# Patient Record
Sex: FEMALE | Race: WHITE | NOT HISPANIC OR LATINO | Employment: OTHER | URBAN - NONMETROPOLITAN AREA
[De-identification: names, ages, dates, MRNs, and addresses within clinical notes are randomized per-mention and may not be internally consistent; named-entity substitution may affect disease eponyms.]

---

## 2017-06-01 ENCOUNTER — OFFICE VISIT (OUTPATIENT)
Dept: GASTROENTEROLOGY | Facility: CLINIC | Age: 67
End: 2017-06-01

## 2017-06-01 ENCOUNTER — PREP FOR SURGERY (OUTPATIENT)
Dept: OTHER | Facility: HOSPITAL | Age: 67
End: 2017-06-01

## 2017-06-01 VITALS
TEMPERATURE: 98.6 F | SYSTOLIC BLOOD PRESSURE: 135 MMHG | DIASTOLIC BLOOD PRESSURE: 70 MMHG | WEIGHT: 145 LBS | HEIGHT: 64 IN | RESPIRATION RATE: 16 BRPM | BODY MASS INDEX: 24.75 KG/M2 | HEART RATE: 66 BPM

## 2017-06-01 DIAGNOSIS — Z80.0 FAMILY HISTORY OF COLON CANCER: ICD-10-CM

## 2017-06-01 DIAGNOSIS — R10.32 LEFT LOWER QUADRANT PAIN: Primary | ICD-10-CM

## 2017-06-01 DIAGNOSIS — K62.5 BRIGHT RED BLOOD PER RECTUM: ICD-10-CM

## 2017-06-01 DIAGNOSIS — R11.0 NAUSEA: ICD-10-CM

## 2017-06-01 PROBLEM — K83.8 CHOLANGIECTASIS: Status: ACTIVE | Noted: 2017-06-01

## 2017-06-01 PROBLEM — K80.20 CALCULUS OF GALLBLADDER: Status: ACTIVE | Noted: 2017-06-01

## 2017-06-01 PROBLEM — K63.5 COLON POLYP: Status: ACTIVE | Noted: 2017-06-01

## 2017-06-01 PROBLEM — K59.00 CONSTIPATION: Status: ACTIVE | Noted: 2017-06-01

## 2017-06-01 PROBLEM — R12 HEARTBURN: Status: ACTIVE | Noted: 2017-06-01

## 2017-06-01 PROCEDURE — 99214 OFFICE O/P EST MOD 30 MIN: CPT | Performed by: NURSE PRACTITIONER

## 2017-06-01 RX ORDER — SODIUM CHLORIDE 0.9 % (FLUSH) 0.9 %
1-10 SYRINGE (ML) INJECTION AS NEEDED
Status: CANCELLED | OUTPATIENT
Start: 2017-06-01

## 2017-06-01 RX ORDER — CIPROFLOXACIN 500 MG/1
500 TABLET, FILM COATED ORAL 2 TIMES DAILY
Qty: 14 TABLET | Refills: 0 | Status: SHIPPED | OUTPATIENT
Start: 2017-06-01 | End: 2017-06-08

## 2017-06-01 RX ORDER — METRONIDAZOLE 250 MG/1
250 TABLET ORAL 4 TIMES DAILY
Qty: 28 TABLET | Refills: 0 | Status: SHIPPED | OUTPATIENT
Start: 2017-06-01 | End: 2017-06-08

## 2017-06-01 RX ORDER — SODIUM CHLORIDE 9 MG/ML
70 INJECTION, SOLUTION INTRAVENOUS CONTINUOUS PRN
Status: CANCELLED | OUTPATIENT
Start: 2017-06-01

## 2017-06-01 NOTE — PROGRESS NOTES
"[address]  [facility] [id number]    Chief Complaint   Patient presents with   • Follow-up   • Abdominal Pain   • Rectal Bleeding   • Nausea     The patient states she had gone to the ER 6 days ago for an aching pain in her lower abdomen. She states it was sudden and lasted for 3-4 days. She states the pain is slowly improving. She was told everything was \"normal\" on CT scan. She was not given any medications. She states she began having bright red blood per rectum at the same time as the pain started. She states the bleeding lasted for 1-2 days, then improved. She had 1 episode of bright red blood with a bowel movement 3 days ago. She has not had any further bleeding. She states she did have nausea during the episode that only lasted 24 hours. This has resolved.     The patient's last colonoscopy was in 2014 with polyps. There is a family history of colon cancer in the patient's brother.    Abdominal Pain   This is a new problem. The current episode started in the past 7 days. The onset quality is sudden. The problem occurs intermittently. The problem has been unchanged. The pain is located in the suprapubic region. The pain is at a severity of 2/10. The pain is mild. The quality of the pain is aching. The abdominal pain does not radiate. Associated symptoms include hematochezia, hematuria, myalgias and nausea. Pertinent negatives include no arthralgias, constipation, diarrhea, dysuria, fever, headaches, melena or vomiting. Nothing aggravates the pain. The pain is relieved by nothing. She has tried nothing for the symptoms. Prior diagnostic workup includes CT scan.   Rectal Bleeding   This is a new problem. The current episode started in the past 7 days. The problem has been gradually improving. Associated symptoms include abdominal pain, myalgias and nausea. Pertinent negatives include no arthralgias, chest pain, chills, coughing, fatigue, fever, headaches, joint swelling, rash or vomiting. Nothing aggravates " the symptoms. She has tried nothing for the symptoms.   Nausea   This is a new problem. Episode onset:  4 days ago. Episode frequency: nausea lasted 24 hours then resolved. The problem has been resolved. Associated symptoms include abdominal pain, myalgias and nausea. Pertinent negatives include no arthralgias, chest pain, chills, coughing, fatigue, fever, headaches, joint swelling, rash or vomiting. Nothing aggravates the symptoms. She has tried nothing for the symptoms.     Review of Systems   Constitutional: Negative for appetite change, chills, fatigue, fever and unexpected weight change.   HENT: Negative for mouth sores, nosebleeds and trouble swallowing.    Eyes: Negative for discharge and redness.   Respiratory: Negative for apnea, cough and shortness of breath.    Cardiovascular: Negative for chest pain, palpitations and leg swelling.   Gastrointestinal: Positive for abdominal pain, anal bleeding, hematochezia and nausea. Negative for abdominal distention, blood in stool, constipation, diarrhea, melena and vomiting.   Endocrine: Negative for cold intolerance, heat intolerance and polydipsia.   Genitourinary: Positive for hematuria. Negative for dysuria and urgency.   Musculoskeletal: Positive for myalgias. Negative for arthralgias and joint swelling.   Skin: Negative for rash.   Allergic/Immunologic: Negative for food allergies and immunocompromised state.   Neurological: Positive for dizziness. Negative for seizures, syncope and headaches.   Hematological: Negative for adenopathy. Does not bruise/bleed easily.   Psychiatric/Behavioral: Negative for dysphoric mood. The patient is not nervous/anxious and is not hyperactive.      Patient Active Problem List   Diagnosis   • Diverticulitis of large intestine   • Fatty liver   • History of esophageal reflux   • History of hypertension   • History of headache   • Diverticulosis of colon   • Calculus of gallbladder   • Colon polyp   • Constipation   •  Cholangiectasis   • Heartburn   • Left lower quadrant pain   • Bright red blood per rectum   • Nausea   • Family history of colon cancer     Past Medical History:   Diagnosis Date   • Calculus of gallbladder     Asymptomatic gallstone   • Colon polyp 2014    Tubular adenomata   • Helicobacter positive gastritis     It appears that the patient had been treated with clarithromycin, Flagyl, and pantoprazole for 14 days and November 2014.   • Hypertension    • UTI (urinary tract infection)      Past Surgical History:   Procedure Laterality Date   • COLONOSCOPY  2014   • ENDOSCOPY      History of Diagnostic Esophagogastroduodenoscopy   • HYSTERECTOMY  2000   • UPPER GASTROINTESTINAL ENDOSCOPY  2014     Family History   Problem Relation Age of Onset   • Cancer Sister    • Stomach cancer Sister    • Colon cancer Brother    • Arthritis Mother    • Hypertension Mother    • Cirrhosis Neg Hx    • Liver disease Neg Hx    • Crohn's disease Neg Hx    • Ulcerative colitis Neg Hx    • Inflammatory bowel disease Neg Hx    • Esophageal cancer Neg Hx    • Liver cancer Neg Hx    • Rectal cancer Neg Hx      Social History   Substance Use Topics   • Smoking status: Current Every Day Smoker     Packs/day: 0.50     Years: 20.00     Types: Cigarettes     Start date: 1974   • Smokeless tobacco: Never Used   • Alcohol use No       Current Outpatient Prescriptions:   •  Acetaminophen (TYLENOL PO), Take  by mouth As Needed., Disp: , Rfl:   •  atenolol (TENORMIN) 25 MG tablet, Take  by mouth daily., Disp: , Rfl:   •  flunisolide (NASALIDE) 25 MCG/ACT (0.025%) solution nasal spray, Inhale 2 sprays every 12 (twelve) hours., Disp: 1 bottle, Rfl: 5  •  lisinopril (PRINIVIL,ZESTRIL) 20 MG tablet, Take  by mouth daily., Disp: , Rfl:   •  ciprofloxacin (CIPRO) 500 MG tablet, Take 1 tablet by mouth 2 (Two) Times a Day for 7 days. Take 1 tablet twice a day x 7 days, Disp: 14 tablet, Rfl: 0  •  metroNIDAZOLE (FLAGYL) 250 MG tablet, Take 1 tablet by mouth  "4 (Four) Times a Day for 7 days. Take 1 tablet four times daily x 7 days, Disp: 28 tablet, Rfl: 0    Allergies   Allergen Reactions   • Penicillins Itching     /70  Pulse 66  Temp 98.6 °F (37 °C)  Resp 16  Ht 64\" (162.6 cm)  Wt 145 lb (65.8 kg)  LMP  (Approximate)  BMI 24.89 kg/m2    Physical Exam   Constitutional: She is oriented to person, place, and time. She appears well-developed and well-nourished. No distress.   HENT:   Head: Normocephalic and atraumatic.   Right Ear: Hearing and external ear normal.   Left Ear: Hearing and external ear normal.   Nose: Nose normal.   Mouth/Throat: Oropharynx is clear and moist and mucous membranes are normal. Mucous membranes are not pale, not dry and not cyanotic. No oral lesions. No oropharyngeal exudate.   Eyes: Conjunctivae and EOM are normal. Right eye exhibits no discharge. Left eye exhibits no discharge.   Neck: Trachea normal. Neck supple. No JVD present. No edema present. No thyroid mass and no thyromegaly present.   Cardiovascular: Normal rate, regular rhythm, S2 normal and normal heart sounds.  Exam reveals no gallop, no S3 and no friction rub.    No murmur heard.  Pulmonary/Chest: Effort normal and breath sounds normal. No respiratory distress. She exhibits no tenderness.   Abdominal: Normal appearance and bowel sounds are normal. She exhibits no distension, no ascites and no mass. There is no splenomegaly or hepatomegaly. There is tenderness (moderate) in the left lower quadrant. There is no rigidity, no rebound and no guarding. No hernia.       Vascular Status -  Her exam exhibits no right foot edema. Her exam exhibits no left foot edema.  Lymphadenopathy:     She has no cervical adenopathy.        Left: No supraclavicular adenopathy present.   Neurological: She is alert and oriented to person, place, and time. She has normal strength. No cranial nerve deficit or sensory deficit.   Skin: No rash noted. She is not diaphoretic. No cyanosis. No pallor. " Nails show no clubbing.   Psychiatric: She has a normal mood and affect.   Nursing note and vitals reviewed.  Stigmata of chronic liver disease:  None.  Asterixis:  None.    Abdominal Imaging:  Upon review of records:    Abdominal ultrasound dated 4/12/2016 reveals liver parenchyma is mildly diffusely hyperechoic suggesting fatty infiltration.  There is no focal hepatic mass or biliary dilatation demonstrated.  Gallbladder shows no evidence of cholelithiasis or wall thickening.  There is no pericholecystic fluid.  Limited views of the pancreas are unremarkable.  There is some thinning of the right renal cortex compatible with atrophy.    CT of abdomen and pelvis without contrast dated 5/25/2017 reveals lung bases are clear.  The heart size is normal.  The limited noncontrast images of the liver are normal.  The spleen is normal.  No adrenal masses are seen.  The aorta is normal in caliber.  There aortoiliac vascular calcifications.  There is no significant free fluid.  The right kidney is normal.  There is a punctate stone within the upper pole of the left kidney.  There is no hydronephrosis.  There are several small periportal lymph nodes which are likely reactive measuring up to 11 mm.  There is mild diverticulosis without CT evidence of diverticulitis.  There is L4-L5 disc space narrowing with vacuum disc phenomenon, annular bulge, and foraminal and central canal stenosis.  The appendix is not identified.  Post hysterectomy.  The urinary bladder is decompressed.  There is no significant fluid or adenopathy.    Procedures:   EGD dated 11/5/2014 reveals subtle concentric rings noted.  Small sliding hiatal hernia less than 3 cm, nonobstructive Schatzki's type ring.  No Arrington's mucosa was seen.  Erythematous distal esophagitis.  Some debris was noted in the esophagus which could easily be me.  Erythematous gastritis. Duodenum second portion biopsy reveals no pathologic alterations, negative for celiac disease.  No  microorganisms or atypia identified.  Antrum and body biopsy reveals H. pylori positive.  Chronic active gastritis.  Negative for metaplasia, dysplasia or malignancy.  Esophagus biopsy mid and proximal revealed reflux esophagitis with rare eosinophils.  No metaplasia identified.  Negative for eosinophilic esophagitis, fungus, dysplasia or malignancy.    Colonoscopy dated 11/13/2014 reveals external hemorrhoids.  Internal hemorrhoids.  Small anal tear.  Colon polyps.  Scant early diverticular change seen in the left colon. Transverse colon polyp biopsy reveals tubular adenoma without high-grade dysplasia.  Sigmoid colon polyp and rectal biopsy reveals multiple hyperplastic polyps.    Federica was seen today for follow-up, abdominal pain, rectal bleeding and nausea.    Diagnoses and all orders for this visit:    Left lower quadrant pain  Comments:  Consistent with possible diverticulitis.  Orders:  -     ciprofloxacin (CIPRO) 500 MG tablet; Take 1 tablet by mouth 2 (Two) Times a Day for 7 days. Take 1 tablet twice a day x 7 days  -     metroNIDAZOLE (FLAGYL) 250 MG tablet; Take 1 tablet by mouth 4 (Four) Times a Day for 7 days. Take 1 tablet four times daily x 7 days    Bright red blood per rectum  Comments:  Differentials include diverticulitis, hemorhroids, fissure, vascular ectasia.  Orders:  -     ciprofloxacin (CIPRO) 500 MG tablet; Take 1 tablet by mouth 2 (Two) Times a Day for 7 days. Take 1 tablet twice a day x 7 days  -     metroNIDAZOLE (FLAGYL) 250 MG tablet; Take 1 tablet by mouth 4 (Four) Times a Day for 7 days. Take 1 tablet four times daily x 7 days    Nausea  Comments:  Resolved    Family history of colon cancer  Comments:  Patient's brother.        Plan  and Patient Instructions:  Patient Instructions   1.Treatment for diverticulitis:  A. Low-fat low fiber diet for 5 days thereafter low-fat high-fiber diet.   B. Cipro (ciprofloxacin) tablets 500 mg. Take 1 tablet by mouth twice a day for 7 days. Side  effects were discussed.   C. Flagyl (metronidazole) tablets 250 mg. Take 1 tablet one by mouth 4 times a day for 7 days. Side effects were discussed.  D. Avoid laxatives, enemas for next 5 days. However, for constipation the patient may use stool softeners.  E. Colonoscopy: Description of the procedure, risks, benefits, alternatives and options, including nonoperative options, were discussed with the patient in detail. The patient understands and wishes to proceed, although it may be advisable to wait 3-4 weeks to schedule procedure.  2. The patient is to call in 1 week with update.    Patient Care Team:  Eliud Lorenzana DO as PCP - General    Hailey Lopez, ASA

## 2017-06-01 NOTE — PATIENT INSTRUCTIONS
1.Treatment for diverticulitis:  A. Low-fat low fiber diet for 5 days thereafter low-fat high-fiber diet.   B. Cipro (ciprofloxacin) tablets 500 mg. Take 1 tablet by mouth twice a day for 7 days. Side effects were discussed.   C. Flagyl (metronidazole) tablets 250 mg. Take 1 tablet one by mouth 4 times a day for 7 days. Side effects were discussed.  D. Avoid laxatives, enemas for next 5 days. However, for constipation the patient may use stool softeners.  E. Colonoscopy: Description of the procedure, risks, benefits, alternatives and options, including nonoperative options, were discussed with the patient in detail. The patient understands and wishes to proceed, although it may be advisable to wait 3-4 weeks to schedule procedure.  2. The patient is to call in 1 week with update.

## 2017-06-29 ENCOUNTER — HOSPITAL ENCOUNTER (OUTPATIENT)
Facility: HOSPITAL | Age: 67
Setting detail: HOSPITAL OUTPATIENT SURGERY
Discharge: HOME | End: 2017-06-29
Attending: INTERNAL MEDICINE | Admitting: INTERNAL MEDICINE

## 2017-06-29 ENCOUNTER — ANESTHESIA (OUTPATIENT)
Dept: GASTROENTEROLOGY | Facility: HOSPITAL | Age: 67
End: 2017-06-29

## 2017-06-29 ENCOUNTER — ANESTHESIA EVENT (OUTPATIENT)
Dept: GASTROENTEROLOGY | Facility: HOSPITAL | Age: 67
End: 2017-06-29

## 2017-06-29 VITALS
WEIGHT: 140 LBS | TEMPERATURE: 97.7 F | DIASTOLIC BLOOD PRESSURE: 63 MMHG | HEART RATE: 55 BPM | OXYGEN SATURATION: 93 % | RESPIRATION RATE: 18 BRPM | HEIGHT: 63 IN | BODY MASS INDEX: 24.8 KG/M2 | SYSTOLIC BLOOD PRESSURE: 133 MMHG

## 2017-06-29 DIAGNOSIS — Z80.0 FAMILY HISTORY OF COLON CANCER: ICD-10-CM

## 2017-06-29 DIAGNOSIS — R10.32 LEFT LOWER QUADRANT PAIN: ICD-10-CM

## 2017-06-29 DIAGNOSIS — K62.5 BRIGHT RED BLOOD PER RECTUM: ICD-10-CM

## 2017-06-29 PROCEDURE — 45385 COLONOSCOPY W/LESION REMOVAL: CPT | Performed by: INTERNAL MEDICINE

## 2017-06-29 PROCEDURE — 45384 COLONOSCOPY W/LESION REMOVAL: CPT | Performed by: INTERNAL MEDICINE

## 2017-06-29 PROCEDURE — 88305 TISSUE EXAM BY PATHOLOGIST: CPT | Performed by: INTERNAL MEDICINE

## 2017-06-29 PROCEDURE — 25010000002 PROPOFOL 200 MG/20ML EMULSION: Performed by: NURSE ANESTHETIST, CERTIFIED REGISTERED

## 2017-06-29 RX ORDER — SODIUM CHLORIDE 0.9 % (FLUSH) 0.9 %
1-10 SYRINGE (ML) INJECTION AS NEEDED
Status: DISCONTINUED | OUTPATIENT
Start: 2017-06-29 | End: 2017-06-29 | Stop reason: HOSPADM

## 2017-06-29 RX ORDER — PROPOFOL 10 MG/ML
INJECTION, EMULSION INTRAVENOUS AS NEEDED
Status: DISCONTINUED | OUTPATIENT
Start: 2017-06-29 | End: 2017-06-29 | Stop reason: SURG

## 2017-06-29 RX ORDER — SODIUM CHLORIDE 9 MG/ML
70 INJECTION, SOLUTION INTRAVENOUS CONTINUOUS PRN
Status: DISCONTINUED | OUTPATIENT
Start: 2017-06-29 | End: 2017-06-29 | Stop reason: HOSPADM

## 2017-06-29 RX ADMIN — SODIUM CHLORIDE 70 ML/HR: 9 INJECTION, SOLUTION INTRAVENOUS at 09:01

## 2017-06-29 RX ADMIN — PROPOFOL 50 MG: 10 INJECTION, EMULSION INTRAVENOUS at 10:34

## 2017-06-29 RX ADMIN — PROPOFOL 100 MG: 10 INJECTION, EMULSION INTRAVENOUS at 11:07

## 2017-06-29 RX ADMIN — PROPOFOL 50 MG: 10 INJECTION, EMULSION INTRAVENOUS at 10:50

## 2017-06-29 RX ADMIN — PROPOFOL 100 MG: 10 INJECTION, EMULSION INTRAVENOUS at 10:41

## 2017-06-29 RX ADMIN — PROPOFOL 50 MG: 10 INJECTION, EMULSION INTRAVENOUS at 10:58

## 2017-06-29 RX ADMIN — PROPOFOL 100 MG: 10 INJECTION, EMULSION INTRAVENOUS at 11:00

## 2017-06-29 RX ADMIN — SODIUM CHLORIDE: 9 INJECTION, SOLUTION INTRAVENOUS at 10:23

## 2017-06-29 NOTE — ANESTHESIA PREPROCEDURE EVALUATION
Anesthesia Evaluation     Patient summary reviewed and Nursing notes reviewed   NPO Solid Status: > 8 hours  NPO Liquid Status: > 8 hours     Airway   Mallampati: II  TM distance: >3 FB  Neck ROM: full  no difficulty expected  Dental      Pulmonary - normal exam   Cardiovascular     Rhythm: regular  Rate: normal    (+) hypertension,       Neuro/Psych  GI/Hepatic/Renal/Endo    (+)  liver disease,     Musculoskeletal     Abdominal    Substance History      OB/GYN          Other   (+) arthritis                                     Anesthesia Plan    ASA 3     MAC     intravenous induction   Anesthetic plan and risks discussed with patient.    Plan discussed with CRNA.

## 2017-06-29 NOTE — ANESTHESIA POSTPROCEDURE EVALUATION
Patient: Federica Lay    Procedure Summary     Date Anesthesia Start Anesthesia Stop Room / Location    06/29/17 1033 [id number] [facility] ENDOSCOPY 2 / [facility] ENDOSCOPY       Procedure Diagnosis Surgeon Provider    COLONOSCOPY WITH HOT BIOPSY POLYPECTOMIES, COLD SNARE POLYPECTOMY (N/A Anus) Bright red blood per rectum; Left lower quadrant pain; Family history of colon cancer  (Bright red blood per rectum [K62.5]; Left lower quadrant pain [R10.32]; Family history of colon cancer [Z80.0]) MD Rik Rodriguez CRNA          Anesthesia Type: MAC  Last vitals  BP      Temp      Pulse     Resp      SpO2        Post Anesthesia Care and Evaluation    Patient location during evaluation: PACU  Patient participation: complete - patient participated  Level of consciousness: awake and alert  Pain score: 0  Pain management: satisfactory to patient  Airway patency: patent  Anesthetic complications: No anesthetic complications  PONV Status: none  Cardiovascular status: stable and acceptable  Respiratory status: acceptable  Hydration status: acceptable

## 2017-07-05 LAB
LAB AP CASE REPORT: NORMAL
Lab: NORMAL
PATH REPORT.FINAL DX SPEC: NORMAL

## 2017-08-21 ENCOUNTER — OFFICE VISIT (OUTPATIENT)
Dept: GASTROENTEROLOGY | Facility: CLINIC | Age: 67
End: 2017-08-21

## 2017-08-21 VITALS
BODY MASS INDEX: 26.05 KG/M2 | HEIGHT: 63 IN | DIASTOLIC BLOOD PRESSURE: 83 MMHG | TEMPERATURE: 98.4 F | RESPIRATION RATE: 16 BRPM | SYSTOLIC BLOOD PRESSURE: 151 MMHG | WEIGHT: 147 LBS | HEART RATE: 67 BPM

## 2017-08-21 DIAGNOSIS — K62.5 BRIGHT RED BLOOD PER RECTUM: ICD-10-CM

## 2017-08-21 DIAGNOSIS — R11.0 NAUSEA: ICD-10-CM

## 2017-08-21 DIAGNOSIS — K63.5 COLON POLYPS: ICD-10-CM

## 2017-08-21 DIAGNOSIS — K64.8 INTERNAL HEMORRHOIDS: Chronic | ICD-10-CM

## 2017-08-21 DIAGNOSIS — R10.32 LEFT LOWER QUADRANT PAIN: Primary | ICD-10-CM

## 2017-08-21 DIAGNOSIS — K57.30 DIVERTICULOSIS OF LARGE INTESTINE WITHOUT HEMORRHAGE: Chronic | ICD-10-CM

## 2017-08-21 PROCEDURE — 99213 OFFICE O/P EST LOW 20 MIN: CPT | Performed by: NURSE PRACTITIONER

## 2017-08-21 NOTE — PROGRESS NOTES
[address]  [facility] [address]    Chief Complaint   Patient presents with   • Follow-up   • Abdominal Pain     History of Present Illness    The patient is here for follow up. The patient states the left lower quadrant pain has resolved. She has not had any further episodes since she finished antibiotics. She is no longer having nausea. This has resolved. She is no longer having bright red blood per rectum. The patient denies heartburn. There is no history of difficulty swallowing.    Review of Systems   Constitutional: Negative for appetite change, chills, fatigue, fever and unexpected weight change.   HENT: Negative for mouth sores, nosebleeds and trouble swallowing.    Eyes: Negative for discharge and redness.   Respiratory: Negative for apnea, cough and shortness of breath.    Cardiovascular: Negative for chest pain, palpitations and leg swelling.   Gastrointestinal: Positive for abdominal pain. Negative for abdominal distention, anal bleeding, blood in stool, constipation, diarrhea, nausea and vomiting.   Endocrine: Negative for cold intolerance, heat intolerance and polydipsia.   Genitourinary: Negative for dysuria, hematuria and urgency.   Musculoskeletal: Positive for arthralgias. Negative for joint swelling and myalgias.   Skin: Negative for rash.   Allergic/Immunologic: Negative for food allergies and immunocompromised state.   Neurological: Negative for dizziness, seizures, syncope and headaches.   Hematological: Negative for adenopathy. Does not bruise/bleed easily.   Psychiatric/Behavioral: Negative for dysphoric mood. The patient is not nervous/anxious and is not hyperactive.      Patient Active Problem List   Diagnosis   • Diverticulitis of large intestine   • Fatty liver   • History of esophageal reflux   • History of hypertension   • History of headache   • Diverticulosis of colon   • Calculus of gallbladder   • Colon polyp   • Constipation   • Cholangiectasis   • Heartburn   • Left lower  quadrant pain   • Bright red blood per rectum   • Nausea   • Family history of colon cancer   • Colon polyps   • Internal hemorrhoids     Past Medical History:   Diagnosis Date   • Calculus of gallbladder     Asymptomatic gallstone   • Colon polyp 2014    Tubular adenomata   • Diverticulitis    • Full dentures    • H/O exercise stress test    • Helicobacter positive gastritis     It appears that the patient had been treated with clarithromycin, Flagyl, and pantoprazole for 14 days and November 2014.   • Hypertension    • UTI (urinary tract infection)    • Wears glasses     TO DRIVE     Past Surgical History:   Procedure Laterality Date   • COLONOSCOPY  2014   • COLONOSCOPY N/A 6/29/2017    Procedure: COLONOSCOPY WITH HOT BIOPSY POLYPECTOMIES, COLD SNARE POLYPECTOMY;  Surgeon: Abraham Stokes MD;  Location: [facility] ENDOSCOPY;  Service:    • ENDOSCOPY     • HYSTERECTOMY  2000   • UPPER GASTROINTESTINAL ENDOSCOPY  2014     Family History   Problem Relation Age of Onset   • Cancer Sister    • Stomach cancer Sister    • Colon cancer Brother    • Arthritis Mother    • Hypertension Mother    • Cirrhosis Neg Hx    • Liver disease Neg Hx    • Crohn's disease Neg Hx    • Ulcerative colitis Neg Hx    • Inflammatory bowel disease Neg Hx    • Esophageal cancer Neg Hx    • Liver cancer Neg Hx    • Rectal cancer Neg Hx      Social History   Substance Use Topics   • Smoking status: Current Every Day Smoker     Packs/day: 0.50     Years: 20.00     Types: Cigarettes     Start date: 1974   • Smokeless tobacco: Never Used   • Alcohol use No       Current Outpatient Prescriptions:   •  Acetaminophen (TYLENOL PO), Take 1,000 mg by mouth Every 6 (Six) Hours As Needed., Disp: , Rfl:   •  atenolol (TENORMIN) 25 MG tablet, Take  by mouth daily., Disp: , Rfl:   •  lisinopril (PRINIVIL,ZESTRIL) 20 MG tablet, Take  by mouth daily., Disp: , Rfl:     Allergies   Allergen Reactions   • Penicillins Itching     /83  Pulse 67  Temp 98.4 °F  "(36.9 °C)  Resp 16  Ht 63\" (160 cm)  Wt 147 lb (66.7 kg)  LMP  (LMP Unknown)  BMI 26.04 kg/m2    Physical Exam   Constitutional: She is oriented to person, place, and time. She appears well-developed and well-nourished. No distress.   HENT:   Head: Normocephalic and atraumatic.   Right Ear: Hearing and external ear normal.   Left Ear: Hearing and external ear normal.   Nose: Nose normal.   Mouth/Throat: Oropharynx is clear and moist and mucous membranes are normal. Mucous membranes are not pale, not dry and not cyanotic. No oral lesions. No oropharyngeal exudate.   Eyes: Conjunctivae and EOM are normal. Right eye exhibits no discharge. Left eye exhibits no discharge.   Neck: Trachea normal. Neck supple. No JVD present. No edema present. No thyroid mass and no thyromegaly present.   Cardiovascular: Normal rate, regular rhythm, S2 normal and normal heart sounds.  Exam reveals no gallop, no S3 and no friction rub.    No murmur heard.  Pulmonary/Chest: Effort normal and breath sounds normal. No respiratory distress. She exhibits no tenderness.   Abdominal: Normal appearance and bowel sounds are normal. She exhibits no distension, no ascites and no mass. There is no splenomegaly or hepatomegaly. There is no tenderness. There is no rigidity, no rebound and no guarding. No hernia.       Vascular Status -  Her exam exhibits no right foot edema. Her exam exhibits no left foot edema.  Lymphadenopathy:     She has no cervical adenopathy.        Left: No supraclavicular adenopathy present.   Neurological: She is alert and oriented to person, place, and time. She has normal strength. No cranial nerve deficit or sensory deficit.   Skin: No rash noted. She is not diaphoretic. No cyanosis. No pallor. Nails show no clubbing.   Psychiatric: She has a normal mood and affect.   Nursing note and vitals reviewed.  Stigmata of chronic liver disease:  None.  Asterixis:  None.    Abdominal Imaging:  Upon review of records:   "   Abdominal ultrasound dated 4/12/2016 reveals liver parenchyma is mildly diffusely hyperechoic suggesting fatty infiltration.  There is no focal hepatic mass or biliary dilatation demonstrated.  Gallbladder shows no evidence of cholelithiasis or wall thickening.  There is no pericholecystic fluid.  Limited views of the pancreas are unremarkable.  There is some thinning of the right renal cortex compatible with atrophy.     CT of abdomen and pelvis without contrast dated 5/25/2017 reveals lung bases are clear.  The heart size is normal.  The limited noncontrast images of the liver are normal.  The spleen is normal.  No adrenal masses are seen.  The aorta is normal in caliber.  There aortoiliac vascular calcifications.  There is no significant free fluid.  The right kidney is normal.  There is a punctate stone within the upper pole of the left kidney.  There is no hydronephrosis.  There are several small periportal lymph nodes which are likely reactive measuring up to 11 mm.  There is mild diverticulosis without CT evidence of diverticulitis.  There is L4-L5 disc space narrowing with vacuum disc phenomenon, annular bulge, and foraminal and central canal stenosis.  The appendix is not identified.  Post hysterectomy.  The urinary bladder is decompressed.  There is no significant fluid or adenopathy.     Procedures:   Upon review of records:    EGD dated 11/5/2014 reveals subtle concentric rings noted.  Small sliding hiatal hernia less than 3 cm, nonobstructive Schatzki's type ring.  No Arrington's mucosa was seen.  Erythematous distal esophagitis.  Some debris was noted in the esophagus which could easily be me.  Erythematous gastritis. Duodenum second portion biopsy reveals no pathologic alterations, negative for celiac disease.  No microorganisms or atypia identified.  Antrum and body biopsy reveals H. pylori positive.  Chronic active gastritis.  Negative for metaplasia, dysplasia or malignancy.  Esophagus biopsy mid  and proximal revealed reflux esophagitis with rare eosinophils.  No metaplasia identified.  Negative for eosinophilic esophagitis, fungus, dysplasia or malignancy.     Colonoscopy dated 11/13/2014 reveals external hemorrhoids.  Internal hemorrhoids.  Small anal tear.  Colon polyps.  Scant early diverticular change seen in the left colon. Transverse colon polyp biopsy reveals tubular adenoma without high-grade dysplasia.  Sigmoid colon polyp and rectal biopsy reveals multiple hyperplastic polyps.    Colonoscopy dated 6/29/2017 reveals scant early diverticular change in the left colon.  Colon polyps.  Internal hemorrhoids.  Descending colon polyp biopsy reveals tubular adenoma fragments without high-grade dysplasia.  Hepatic flexure polyp reveals tubular adenoma fragments without high-grade dysplasia.  Rectal polyp biopsy reveals hyperplastic polyp.    Assessment and Plan:      Federica was seen today for follow-up and abdominal pain.    Diagnoses and all orders for this visit:    Left lower quadrant pain  Comments:  Resolved.    Bright red blood per rectum  Comments:  Improved. Likely secondary to hemorrhoids.    Nausea  Comments:  Resolved.    Diverticulosis of large intestine without hemorrhage  Comments:  Scant left sided diverticular change. Stable.    Colon polyps  Comments:  Tubular adenoma.    Internal hemorrhoids  Comments:  Stable. Uncomplicated.        Plan  and Patient Instructions:  Patient Instructions   1. High fiber diet with liberal water intake. Discussed in detail.  2. Follow up colonoscopy in 5 years.  3. Follow up: PRN    Patient Care Team:  Eliud Lorenzana DO as PCP - General    [facility]

## 2017-08-21 NOTE — PATIENT INSTRUCTIONS
1. High fiber diet with liberal water intake. Discussed in detail.  2. Follow up colonoscopy in 5 years.  3. Follow up: PRN

## 2017-08-24 ENCOUNTER — TRANSCRIBE ORDERS (OUTPATIENT)
Dept: ULTRASOUND IMAGING | Facility: HOSPITAL | Age: 67
End: 2017-08-24

## 2017-08-24 DIAGNOSIS — N64.4 BREAST PAIN: Primary | ICD-10-CM

## 2017-09-08 ENCOUNTER — HOSPITAL ENCOUNTER (OUTPATIENT)
Dept: ULTRASOUND IMAGING | Facility: HOSPITAL | Age: 67
Discharge: HOME | End: 2017-09-08
Admitting: NURSE PRACTITIONER

## 2017-09-08 DIAGNOSIS — N64.4 BREAST PAIN: ICD-10-CM

## 2017-09-08 PROCEDURE — 76641 ULTRASOUND BREAST COMPLETE: CPT

## 2017-11-08 ENCOUNTER — HOSPITAL ENCOUNTER (OUTPATIENT)
Dept: CT IMAGING | Facility: HOSPITAL | Age: 67
Discharge: HOME | End: 2017-11-08
Admitting: NURSE PRACTITIONER

## 2017-11-08 ENCOUNTER — LAB (OUTPATIENT)
Dept: LAB | Facility: HOSPITAL | Age: 67
End: 2017-11-08

## 2017-11-08 ENCOUNTER — TRANSCRIBE ORDERS (OUTPATIENT)
Dept: LAB | Facility: HOSPITAL | Age: 67
End: 2017-11-08

## 2017-11-08 DIAGNOSIS — R10.31 ABDOMINAL PAIN, RIGHT LOWER QUADRANT: ICD-10-CM

## 2017-11-08 DIAGNOSIS — R10.31 ABDOMINAL PAIN, RIGHT LOWER QUADRANT: Primary | ICD-10-CM

## 2017-11-08 LAB
ALBUMIN SERPL-MCNC: 4.5 G/DL (ref 3.5–5)
ALBUMIN/GLOB SERPL: 1.9 G/DL (ref 1–2)
ALP SERPL-CCNC: 68 U/L (ref 38–126)
ALT SERPL W P-5'-P-CCNC: 29 U/L (ref 13–69)
AMYLASE SERPL-CCNC: 94 U/L (ref 30–110)
ANION GAP SERPL CALCULATED.3IONS-SCNC: 14.7 MMOL/L
AST SERPL-CCNC: 22 U/L (ref 15–46)
BASOPHILS # BLD AUTO: 0.05 10*3/MM3 (ref 0–0.2)
BASOPHILS NFR BLD AUTO: 0.6 % (ref 0–2.5)
BILIRUB SERPL-MCNC: 0.3 MG/DL (ref 0.2–1.3)
BUN BLD-MCNC: 14 MG/DL (ref 7–20)
BUN/CREAT SERPL: 11.7 (ref 7.1–23.5)
CALCIUM SPEC-SCNC: 9.6 MG/DL (ref 8.4–10.2)
CHLORIDE SERPL-SCNC: 105 MMOL/L (ref 98–107)
CO2 SERPL-SCNC: 29 MMOL/L (ref 26–30)
CREAT BLD-MCNC: 1.2 MG/DL (ref 0.6–1.3)
DEPRECATED RDW RBC AUTO: 44.6 FL (ref 37–54)
EOSINOPHIL # BLD AUTO: 0.24 10*3/MM3 (ref 0–0.7)
EOSINOPHIL NFR BLD AUTO: 2.9 % (ref 0–7)
ERYTHROCYTE [DISTWIDTH] IN BLOOD BY AUTOMATED COUNT: 12.2 % (ref 11.5–14.5)
GFR SERPL CREATININE-BSD FRML MDRD: 45 ML/MIN/1.73
GLOBULIN UR ELPH-MCNC: 2.4 GM/DL
GLUCOSE BLD-MCNC: 98 MG/DL (ref 74–98)
HCT VFR BLD AUTO: 42.6 % (ref 37–47)
HGB BLD-MCNC: 13.8 G/DL (ref 12–16)
IMM GRANULOCYTES # BLD: 0.03 10*3/MM3 (ref 0–0.06)
IMM GRANULOCYTES NFR BLD: 0.4 % (ref 0–0.6)
LIPASE SERPL-CCNC: 155 U/L (ref 23–300)
LYMPHOCYTES # BLD AUTO: 2.37 10*3/MM3 (ref 0.6–3.4)
LYMPHOCYTES NFR BLD AUTO: 28.2 % (ref 10–50)
MCH RBC QN AUTO: 32.3 PG (ref 27–31)
MCHC RBC AUTO-ENTMCNC: 32.4 G/DL (ref 30–37)
MCV RBC AUTO: 99.8 FL (ref 81–99)
MONOCYTES # BLD AUTO: 0.55 10*3/MM3 (ref 0–0.9)
MONOCYTES NFR BLD AUTO: 6.5 % (ref 0–12)
NEUTROPHILS # BLD AUTO: 5.17 10*3/MM3 (ref 2–6.9)
NEUTROPHILS NFR BLD AUTO: 61.4 % (ref 37–80)
NRBC BLD MANUAL-RTO: 0 /100 WBC (ref 0–0)
PLATELET # BLD AUTO: 209 10*3/MM3 (ref 130–400)
PMV BLD AUTO: 10.8 FL (ref 6–12)
POTASSIUM BLD-SCNC: 4.7 MMOL/L (ref 3.5–5.1)
PROT SERPL-MCNC: 6.9 G/DL (ref 6.3–8.2)
RBC # BLD AUTO: 4.27 10*6/MM3 (ref 4.2–5.4)
SODIUM BLD-SCNC: 144 MMOL/L (ref 137–145)
WBC NRBC COR # BLD: 8.41 10*3/MM3 (ref 4.8–10.8)

## 2017-11-08 PROCEDURE — 82150 ASSAY OF AMYLASE: CPT | Performed by: NURSE PRACTITIONER

## 2017-11-08 PROCEDURE — 0 IOPAMIDOL 61 % SOLUTION: Performed by: RADIOLOGY

## 2017-11-08 PROCEDURE — 85025 COMPLETE CBC W/AUTO DIFF WBC: CPT | Performed by: NURSE PRACTITIONER

## 2017-11-08 PROCEDURE — 80053 COMPREHEN METABOLIC PANEL: CPT | Performed by: NURSE PRACTITIONER

## 2017-11-08 PROCEDURE — 83690 ASSAY OF LIPASE: CPT | Performed by: NURSE PRACTITIONER

## 2017-11-08 PROCEDURE — 74177 CT ABD & PELVIS W/CONTRAST: CPT

## 2017-11-08 PROCEDURE — 36415 COLL VENOUS BLD VENIPUNCTURE: CPT

## 2017-11-08 RX ADMIN — IOPAMIDOL 90 ML: 612 INJECTION, SOLUTION INTRAVENOUS at 18:00

## 2018-01-25 ENCOUNTER — OFFICE VISIT (OUTPATIENT)
Dept: NEUROLOGY | Facility: CLINIC | Age: 68
End: 2018-01-25

## 2018-01-25 VITALS
SYSTOLIC BLOOD PRESSURE: 140 MMHG | BODY MASS INDEX: 26.05 KG/M2 | HEIGHT: 63 IN | DIASTOLIC BLOOD PRESSURE: 78 MMHG | HEART RATE: 72 BPM | OXYGEN SATURATION: 96 % | WEIGHT: 147 LBS

## 2018-01-25 DIAGNOSIS — R42 VERTIGO: Primary | ICD-10-CM

## 2018-01-25 PROCEDURE — 99203 OFFICE O/P NEW LOW 30 MIN: CPT | Performed by: NURSE PRACTITIONER

## 2018-01-25 RX ORDER — MECLIZINE HYDROCHLORIDE 25 MG/1
25 TABLET ORAL 3 TIMES DAILY PRN
COMMUNITY
End: 2020-06-09

## 2018-08-06 ENCOUNTER — TRANSCRIBE ORDERS (OUTPATIENT)
Dept: ADMINISTRATIVE | Facility: HOSPITAL | Age: 68
End: 2018-08-06

## 2018-08-06 DIAGNOSIS — Z87.891 PERSONAL HISTORY OF NICOTINE DEPENDENCE: Primary | ICD-10-CM

## 2018-08-14 ENCOUNTER — HOSPITAL ENCOUNTER (OUTPATIENT)
Dept: CT IMAGING | Facility: HOSPITAL | Age: 68
Discharge: HOME | End: 2018-08-14
Admitting: NURSE PRACTITIONER

## 2018-08-14 DIAGNOSIS — Z87.891 PERSONAL HISTORY OF NICOTINE DEPENDENCE: ICD-10-CM

## 2018-08-14 PROCEDURE — G0297 LDCT FOR LUNG CA SCREEN: HCPCS

## 2018-12-26 ENCOUNTER — TRANSCRIBE ORDERS (OUTPATIENT)
Dept: ADMINISTRATIVE | Facility: HOSPITAL | Age: 68
End: 2018-12-26

## 2018-12-26 DIAGNOSIS — R55 SYNCOPE, UNSPECIFIED SYNCOPE TYPE: Primary | ICD-10-CM

## 2019-08-22 ENCOUNTER — TRANSCRIBE ORDERS (OUTPATIENT)
Dept: ADMINISTRATIVE | Facility: HOSPITAL | Age: 69
End: 2019-08-22

## 2019-08-22 DIAGNOSIS — Z87.891 PERSONAL HISTORY OF TOBACCO USE, PRESENTING HAZARDS TO HEALTH: Primary | ICD-10-CM

## 2019-09-09 ENCOUNTER — APPOINTMENT (OUTPATIENT)
Dept: CT IMAGING | Facility: HOSPITAL | Age: 69
End: 2019-09-09

## 2019-09-16 ENCOUNTER — HOSPITAL ENCOUNTER (OUTPATIENT)
Dept: CT IMAGING | Facility: HOSPITAL | Age: 69
Discharge: HOME | End: 2019-09-16
Admitting: NURSE PRACTITIONER

## 2019-09-16 DIAGNOSIS — Z87.891 PERSONAL HISTORY OF TOBACCO USE, PRESENTING HAZARDS TO HEALTH: ICD-10-CM

## 2019-09-16 PROCEDURE — G0297 LDCT FOR LUNG CA SCREEN: HCPCS

## 2020-03-04 ENCOUNTER — TRANSCRIBE ORDERS (OUTPATIENT)
Dept: CT IMAGING | Facility: HOSPITAL | Age: 70
End: 2020-03-04

## 2020-03-04 DIAGNOSIS — R10.31 ABDOMINAL PAIN, RIGHT LOWER QUADRANT: Primary | ICD-10-CM

## 2020-03-11 ENCOUNTER — APPOINTMENT (OUTPATIENT)
Dept: CT IMAGING | Facility: HOSPITAL | Age: 70
End: 2020-03-11

## 2020-03-19 ENCOUNTER — TRANSCRIBE ORDERS (OUTPATIENT)
Dept: ADMINISTRATIVE | Facility: HOSPITAL | Age: 70
End: 2020-03-19

## 2020-03-19 DIAGNOSIS — R93.2 ABNORMAL ULTRASOUND OF GALLBLADDER: Primary | ICD-10-CM

## 2020-03-25 ENCOUNTER — APPOINTMENT (OUTPATIENT)
Dept: NUCLEAR MEDICINE | Facility: HOSPITAL | Age: 70
End: 2020-03-25

## 2020-04-29 ENCOUNTER — APPOINTMENT (OUTPATIENT)
Dept: NUCLEAR MEDICINE | Facility: HOSPITAL | Age: 70
End: 2020-04-29

## 2020-05-27 ENCOUNTER — HOSPITAL ENCOUNTER (OUTPATIENT)
Dept: ULTRASOUND IMAGING | Facility: HOSPITAL | Age: 70
Discharge: HOME | End: 2020-05-27
Admitting: NURSE PRACTITIONER

## 2020-05-27 DIAGNOSIS — R93.2 ABNORMAL ULTRASOUND OF GALLBLADDER: ICD-10-CM

## 2020-05-27 PROCEDURE — 76700 US EXAM ABDOM COMPLETE: CPT

## 2020-05-29 ENCOUNTER — HOSPITAL ENCOUNTER (OUTPATIENT)
Dept: NUCLEAR MEDICINE | Facility: HOSPITAL | Age: 70
Discharge: HOME | End: 2020-05-29

## 2020-05-29 DIAGNOSIS — R93.2 ABNORMAL ULTRASOUND OF GALLBLADDER: ICD-10-CM

## 2020-05-29 PROCEDURE — A9537 TC99M MEBROFENIN: HCPCS | Performed by: NURSE PRACTITIONER

## 2020-05-29 PROCEDURE — 78227 HEPATOBIL SYST IMAGE W/DRUG: CPT

## 2020-05-29 PROCEDURE — 0 TECHNETIUM TC 99M MEBROFENIN KIT: Performed by: NURSE PRACTITIONER

## 2020-05-29 PROCEDURE — 25010000002 SINCALIDE PER 5 MCG: Performed by: NURSE PRACTITIONER

## 2020-05-29 RX ORDER — KIT FOR THE PREPARATION OF TECHNETIUM TC 99M MEBROFENIN 45 MG/10ML
1 INJECTION, POWDER, LYOPHILIZED, FOR SOLUTION INTRAVENOUS
Status: COMPLETED | OUTPATIENT
Start: 2020-05-29 | End: 2020-05-29

## 2020-05-29 RX ADMIN — MEBROFENIN 1 DOSE: 45 INJECTION, POWDER, LYOPHILIZED, FOR SOLUTION INTRAVENOUS at 10:37

## 2020-05-29 RX ADMIN — SINCALIDE 1.3 MCG: 5 INJECTION, POWDER, LYOPHILIZED, FOR SOLUTION INTRAVENOUS at 11:20

## 2020-06-08 RX ORDER — AMLODIPINE BESYLATE 10 MG/1
10 TABLET ORAL DAILY
COMMUNITY
End: 2020-07-17 | Stop reason: HOSPADM

## 2020-06-09 ENCOUNTER — RESULTS ENCOUNTER (OUTPATIENT)
Dept: GASTROENTEROLOGY | Facility: CLINIC | Age: 70
End: 2020-06-09

## 2020-06-09 ENCOUNTER — OFFICE VISIT (OUTPATIENT)
Dept: GASTROENTEROLOGY | Facility: CLINIC | Age: 70
End: 2020-06-09

## 2020-06-09 VITALS
HEART RATE: 65 BPM | WEIGHT: 161 LBS | RESPIRATION RATE: 12 BRPM | SYSTOLIC BLOOD PRESSURE: 173 MMHG | HEIGHT: 63 IN | TEMPERATURE: 99.1 F | BODY MASS INDEX: 28.53 KG/M2 | DIASTOLIC BLOOD PRESSURE: 83 MMHG

## 2020-06-09 DIAGNOSIS — R13.10 DYSPHAGIA, UNSPECIFIED TYPE: Chronic | ICD-10-CM

## 2020-06-09 DIAGNOSIS — Z11.59 ENCOUNTER FOR SCREENING FOR OTHER VIRAL DISEASES: ICD-10-CM

## 2020-06-09 DIAGNOSIS — R07.9 CHEST PAIN, UNSPECIFIED TYPE: Chronic | ICD-10-CM

## 2020-06-09 DIAGNOSIS — R10.13 EPIGASTRIC PAIN: Chronic | ICD-10-CM

## 2020-06-09 DIAGNOSIS — K76.0 FATTY INFILTRATION OF LIVER: Chronic | ICD-10-CM

## 2020-06-09 DIAGNOSIS — K83.8 DILATED CBD, ACQUIRED: Chronic | ICD-10-CM

## 2020-06-09 DIAGNOSIS — K83.8 DILATED CBD, ACQUIRED: Primary | Chronic | ICD-10-CM

## 2020-06-09 PROBLEM — R10.32 LEFT LOWER QUADRANT PAIN: Status: RESOLVED | Noted: 2017-06-01 | Resolved: 2020-06-09

## 2020-06-09 PROBLEM — K62.5 BRIGHT RED BLOOD PER RECTUM: Status: RESOLVED | Noted: 2017-06-01 | Resolved: 2020-06-09

## 2020-06-09 PROCEDURE — 99214 OFFICE O/P EST MOD 30 MIN: CPT | Performed by: NURSE PRACTITIONER

## 2020-06-09 RX ORDER — LORATADINE 10 MG/1
10 TABLET ORAL DAILY
COMMUNITY
End: 2021-05-04

## 2020-06-09 RX ORDER — PANTOPRAZOLE SODIUM 40 MG/1
TABLET, DELAYED RELEASE ORAL
Qty: 30 TABLET | Refills: 5 | Status: SHIPPED | OUTPATIENT
Start: 2020-06-09 | End: 2021-05-04

## 2020-06-09 NOTE — PATIENT INSTRUCTIONS
1. Antireflux measures: Avoid fried, fatty foods, alcohol, chocolate, coffee, tea,  soft drinks, peppermint and spearmint, spicy foods, tomatoes and tomato based foods, onion based foods, and smoking. Other antireflux measures include weight reduction if overweight, avoiding tight clothing around the abdomen, elevating the head of the bed 6 inches with blocks under the head board, and don't drink or eat before going to bed and avoid lying down immediately after meals.  2. Pantoprazole 40 mg 1 by mouth in the am 30 minutes before breakfast.  3. High fiber, low fat diet with liberal water intake.   4. CBC, CMP, hepatitis panel, MATT, mitochondrial Ab, antimicrosomal Ab, Smooth muscle Ab  5. MRCP-the patient wishes to have done at [facility].  6. The patient may call for results.  7. Dietary instructions.  The patient should eat relatively soft diet, and should eat in upright position and chew well.  The patient should drink water after 2-3 bites and take medications with liberal amounts of water.  Generally medications that have a potential to cause pill esophagitis may be avoided or used in an alternative form (for example if the patient needs potassium it may be used in a liquid rather than pill form).  Furthermore after eating and taking medications, the patient should remain in upright position for 5-10 minutes.  8. Possible EGD in the future.   9. Follow up: 4-6 weeks

## 2020-06-09 NOTE — PROGRESS NOTES
Chief Complaint   Patient presents with   • Follow-up   • Abdominal Pain   • Chest Pain     The patient has been having pain in her upper chest near her clavicle for the past 6 months or more. The patient has the pain every day. Eating does not affect the pain. The pain is mild. The pain is described as tenderness to touch. Movement makes the pain worse. The patient denies jaw pain or arm pain. No shortness of breath. The patient has been seen by cardiology, Dr. Patel, and told there was no evidence of cardiac cause.    There is a long standing history of epigastric pain. The patient may have the pain once per week. The pain is mild and sharp. Eating does not affect the pain. There is no history of heartburn or reflux.    There is a history of difficulty swallowing for the past few months. The patient has difficulty swallowing solids a few times per week. No history of difficulty swallowing liquids.    There is no history of constipation or diarrhea. The patient denies bright red blood per rectum or melena.    Fatty infiltration of the liver was noted on recent ultrasound. CBD was dilated to 10.3 mm in presence of gallbladder. No stones were noted. Liver enzymes were normal per labs in March 2020.    The patient's last colonoscopy was in 2017. There is a family history of colon cancer in the patient's brother.    Abdominal Pain   This is a chronic problem. The current episode started more than 1 month ago. The onset quality is gradual. The problem occurs intermittently. The problem has been unchanged. The pain is located in the epigastric region. The pain is mild. The quality of the pain is sharp. The abdominal pain does not radiate. Associated symptoms include arthralgias. Pertinent negatives include no constipation, diarrhea, dysuria, fever, headaches, hematuria, myalgias, nausea or vomiting. Nothing aggravates the pain. The pain is relieved by nothing. She has tried nothing for the symptoms.   Chest Pain    This  is a chronic problem. Episode onset: over 6 months ago. The onset quality is gradual. The problem occurs daily. The problem has been unchanged. Pain location: clavicle. The pain is mild. Quality: tender to touch. The pain does not radiate. Associated symptoms include dizziness (vertigo). Pertinent negatives include no abdominal pain, cough, fever, headaches, nausea, palpitations, shortness of breath or vomiting. The pain is aggravated by movement. She has tried nothing for the symptoms. There are no known risk factors.   Pertinent negatives for past medical history include no seizures.   Difficulty Swallowing   This is a chronic problem. Episode onset: 2-3 months ago. The problem occurs intermittently. The problem has been unchanged. Associated symptoms include arthralgias and chest pain. Pertinent negatives include no abdominal pain, chills, coughing, fatigue, fever, headaches, joint swelling, myalgias, nausea, rash or vomiting. The symptoms are aggravated by eating. She has tried nothing for the symptoms.     Review of Systems   Constitutional: Negative for appetite change, chills, fatigue, fever and unexpected weight change.   HENT: Negative for mouth sores, nosebleeds and trouble swallowing.    Eyes: Negative for discharge and redness.   Respiratory: Negative for apnea, cough and shortness of breath.    Cardiovascular: Positive for chest pain. Negative for palpitations and leg swelling.   Gastrointestinal: Negative for abdominal distention, abdominal pain, anal bleeding, blood in stool, constipation, diarrhea, nausea and vomiting.   Endocrine: Negative for cold intolerance, heat intolerance and polydipsia.   Genitourinary: Negative for dysuria, hematuria and urgency.   Musculoskeletal: Positive for arthralgias. Negative for joint swelling and myalgias.   Skin: Negative for rash.   Allergic/Immunologic: Positive for environmental allergies. Negative for food allergies and immunocompromised state.   Neurological:  Positive for dizziness (vertigo). Negative for seizures, syncope and headaches.   Hematological: Negative for adenopathy. Does not bruise/bleed easily.   Psychiatric/Behavioral: Negative for dysphoric mood. The patient is not nervous/anxious and is not hyperactive.      Patient Active Problem List   Diagnosis   • Diverticulitis of large intestine   • Fatty infiltration of liver   • History of esophageal reflux   • History of hypertension   • History of headache   • Diverticulosis of colon   • Calculus of gallbladder   • Colon polyp   • Constipation   • Dilated cbd, acquired   • Heartburn   • Nausea   • Family history of colon cancer   • Colon polyps   • Internal hemorrhoids   • Epigastric pain   • Chest pain   • Dysphagia     Past Medical History:   Diagnosis Date   • Calculus of gallbladder     Asymptomatic gallstone   • Chronic airway obstruction (CMS/HCC)    • Colon polyp 2014    Tubular adenomata   • Colon polyps 06/29/2017   • Diverticulitis    • Full dentures    • H/O exercise stress test    • Helicobacter positive gastritis     It appears that the patient had been treated with clarithromycin, Flagyl, and pantoprazole for 14 days and November 2014.   • Hypertension    • UTI (urinary tract infection)    • Vertigo    • Wears glasses     TO DRIVE     Past Surgical History:   Procedure Laterality Date   • COLONOSCOPY  2014   • COLONOSCOPY N/A 6/29/2017    Procedure: COLONOSCOPY WITH HOT BIOPSY POLYPECTOMIES, COLD SNARE POLYPECTOMY;  Surgeon: Abraham Stokes MD;  Location: [facility] ENDOSCOPY;  Service:    • ENDOSCOPY     • HYSTERECTOMY  2000   • UPPER GASTROINTESTINAL ENDOSCOPY  2014     Family History   Problem Relation Age of Onset   • Cancer Sister    • Stomach cancer Sister    • Colon cancer Brother    • Arthritis Mother    • Hypertension Mother    • Cirrhosis Neg Hx    • Liver disease Neg Hx    • Crohn's disease Neg Hx    • Liver cancer Neg Hx      Social History     Tobacco Use   • Smoking status: Former Smoker  "    Packs/day: 0.50     Years: 20.00     Pack years: 10.00     Types: Cigarettes     Start date: 1974     Last attempt to quit: 2/11/2017     Years since quitting: 3.3   • Smokeless tobacco: Never Used   Substance Use Topics   • Alcohol use: No       Current Outpatient Medications:   •  Acetaminophen (TYLENOL PO), Take 1,000 mg by mouth Every 6 (Six) Hours As Needed., Disp: , Rfl:   •  amLODIPine (NORVASC) 10 MG tablet, Take 10 mg by mouth Daily., Disp: , Rfl:   •  ASPIRIN 81 PO, Take 81 mg by mouth Daily., Disp: , Rfl:   •  atenolol (TENORMIN) 25 MG tablet, Take  by mouth daily., Disp: , Rfl:   •  lisinopril (PRINIVIL,ZESTRIL) 40 MG tablet, Take 40 mg by mouth Daily., Disp: , Rfl:   •  loratadine (Claritin) 10 MG tablet, Take 10 mg by mouth Daily., Disp: , Rfl:   •  pantoprazole (PROTONIX) 40 MG EC tablet, 1 po daily in the am 30 minutes before breakfast, Disp: 30 tablet, Rfl: 5    Allergies   Allergen Reactions   • Penicillins Itching     /83   Pulse 65   Temp 99.1 °F (37.3 °C)   Resp 12   Ht 160 cm (63\")   Wt 73 kg (161 lb)   LMP  (LMP Unknown)   BMI 28.52 kg/m²     Physical Exam   Constitutional: She is oriented to person, place, and time. She appears well-developed and well-nourished. No distress.   HENT:   Head: Normocephalic and atraumatic.   Right Ear: Hearing and external ear normal.   Left Ear: Hearing and external ear normal.   Nose: Nose normal.   Mouth/Throat: Oropharynx is clear and moist and mucous membranes are normal. Mucous membranes are not pale, not dry and not cyanotic. No oral lesions. No oropharyngeal exudate.   Eyes: Conjunctivae and EOM are normal. Right eye exhibits no discharge. Left eye exhibits no discharge.   Neck: Trachea normal. Neck supple. No JVD present. No edema present. No thyroid mass and no thyromegaly present.   Cardiovascular: Normal rate, regular rhythm, S2 normal and normal heart sounds. Exam reveals no gallop, no S3 and no friction rub.   No murmur " heard.  Pulmonary/Chest: Effort normal and breath sounds normal. No respiratory distress. She exhibits no tenderness.   Abdominal: Normal appearance and bowel sounds are normal. She exhibits no distension, no ascites and no mass. There is no splenomegaly or hepatomegaly. There is tenderness (mild) in the epigastric area. There is no rigidity, no rebound and no guarding. No hernia.     Vascular Status -  Her right foot exhibits no edema. Her left foot exhibits no edema.  Lymphadenopathy:     She has no cervical adenopathy.        Left: No supraclavicular adenopathy present.   Neurological: She is alert and oriented to person, place, and time. She has normal strength. No cranial nerve deficit or sensory deficit.   Skin: No rash noted. She is not diaphoretic. No cyanosis. No pallor. Nails show no clubbing.   Psychiatric: She has a normal mood and affect.   Nursing note and vitals reviewed.  Stigmata of chronic liver disease:  None.  Asterixis:  None.    Laboratory Tests:   Upon review of medical records:    Dated 3/5/2020 glucose 120 potassium 4.9 sodium 143 BUN 15 creatinine 1.08 albumin 4.4 alkaline phosphatase 77 AST 15 ALT 11    Abdominal Imaging:  Upon review of medical records:    Abdominal ultrasound dated 4/12/2016 reveals liver parenchyma is mildly diffusely hyperechoic suggesting fatty infiltration. There is no focal hepatic mass or biliary dilatation demonstrated. Gallbladder shows no evidence of cholelithiasis or wall thickening. There is no pericholecystic fluid. Limited views of the pancreas are unremarkable. There is some thinning of the right renal cortex compatible with atrophy.     CT of abdomen and pelvis without contrast dated 5/25/2017 reveals lung bases are clear.  The heart size is normal.  The limited noncontrast images of the liver are normal.  The spleen is normal.  No adrenal masses are seen.  The aorta is normal in caliber.  There aortoiliac vascular calcifications.  There is no significant  free fluid.  The right kidney is normal.  There is a punctate stone within the upper pole of the left kidney.  There is no hydronephrosis.  There are several small periportal lymph nodes which are likely reactive measuring up to 11 mm.  There is mild diverticulosis without CT evidence of diverticulitis.  There is L4-L5 disc space narrowing with vacuum disc phenomenon, annular bulge, and foraminal and central canal stenosis.  The appendix is not identified.  Post hysterectomy.  The urinary bladder is decompressed.  There is no significant fluid or adenopathy.    Abdominal ultrasound dated 5/27/2020 reveals visualized pancreas is unremarkable. The liver is echogenic consistent with fatty infiltration. The gallbladder is unremarkable with no shadowing stones or wall thickening. The common duct measures 10.30 mm. The right kidney measures 9.7 cm in length and is normal in echogenicity without hydronephrosis. The left kidney measures 10.2 cm in length and is normal in echogenicity without hydronephrosis. Spleen is unremarkable. The aorta is normal in caliber. The vena cava is unremarkable.    Nuclear medicine HIDA scan dated 5/29/2020 reveals normal hepatobiliary scan with a gallbladder ejection fraction of 61%.     Procedures:   Upon review of records:     EGD dated 11/5/2014 reveals subtle concentric rings noted.  Small sliding hiatal hernia less than 3 cm, nonobstructive Schatzki's type ring.  No Arrington's mucosa was seen.  Erythematous distal esophagitis.  Some debris was noted in the esophagus which could easily be moved.  Erythematous gastritis. Duodenum second portion biopsy reveals no pathologic alterations, negative for celiac disease.  No microorganisms or atypia identified.  Antrum and body biopsy reveals H. pylori positive.  Chronic active gastritis.  Negative for metaplasia, dysplasia or malignancy.  Esophagus biopsy mid and proximal revealed reflux esophagitis with rare eosinophils.  No metaplasia  identified.  Negative for eosinophilic esophagitis, fungus, dysplasia or malignancy.     Colonoscopy dated 11/13/2014 reveals external hemorrhoids.  Internal hemorrhoids.  Small anal tear.  Colon polyps.  Scant early diverticular change seen in the left colon. Transverse colon polyp biopsy reveals tubular adenoma without high-grade dysplasia.  Sigmoid colon polyp and rectal biopsy reveals multiple hyperplastic polyps.     Colonoscopy dated 6/29/2017 reveals scant early diverticular change in the left colon.  Colon polyps.  Internal hemorrhoids.  Descending colon polyp biopsy reveals tubular adenoma fragments without high-grade dysplasia.  Hepatic flexure polyp reveals tubular adenoma fragments without high-grade dysplasia.  Rectal polyp biopsy reveals hyperplastic polyp.    Assessment:      ICD-10-CM ICD-9-CM   1. Dilated cbd, acquired K83.8 576.8   2. Fatty infiltration of liver K76.0 571.8   3. Epigastric pain R10.13 789.06   4. Chest pain, unspecified type R07.9 786.50   5. Dysphagia, unspecified type R13.10 787.20   6. Encounter for screening for other viral diseases  Z11.59 V73.89     Plan/  Patient Instructions   1. Antireflux measures: Avoid fried, fatty foods, alcohol, chocolate, coffee, tea,  soft drinks, peppermint and spearmint, spicy foods, tomatoes and tomato based foods, onion based foods, and smoking. Other antireflux measures include weight reduction if overweight, avoiding tight clothing around the abdomen, elevating the head of the bed 6 inches with blocks under the head board, and don't drink or eat before going to bed and avoid lying down immediately after meals.  2. Pantoprazole 40 mg 1 by mouth in the am 30 minutes before breakfast.  3. High fiber, low fat diet with liberal water intake.   4. CBC, CMP, hepatitis panel, MATT, mitochondrial Ab, antimicrosomal Ab, Smooth muscle Ab  5. MRCP-the patient wishes to have done at [facility].  6. The patient may call for results.  7. Dietary  instructions.  The patient should eat relatively soft diet, and should eat in upright position and chew well.  The patient should drink water after 2-3 bites and take medications with liberal amounts of water.  Generally medications that have a potential to cause pill esophagitis may be avoided or used in an alternative form (for example if the patient needs potassium it may be used in a liquid rather than pill form).  Furthermore after eating and taking medications, the patient should remain in upright position for 5-10 minutes.  8. Possible EGD in the future.   9. Follow up: 4-6 weeks     ASA Urbano

## 2020-07-07 ENCOUNTER — OFFICE VISIT (OUTPATIENT)
Dept: GASTROENTEROLOGY | Facility: CLINIC | Age: 70
End: 2020-07-07

## 2020-07-07 VITALS
HEART RATE: 64 BPM | BODY MASS INDEX: 28.17 KG/M2 | WEIGHT: 159 LBS | HEIGHT: 63 IN | TEMPERATURE: 97.5 F | DIASTOLIC BLOOD PRESSURE: 79 MMHG | RESPIRATION RATE: 16 BRPM | SYSTOLIC BLOOD PRESSURE: 192 MMHG

## 2020-07-07 DIAGNOSIS — R10.13 EPIGASTRIC PAIN: Primary | Chronic | ICD-10-CM

## 2020-07-07 DIAGNOSIS — Z12.11 COLON CANCER SCREENING: ICD-10-CM

## 2020-07-07 DIAGNOSIS — Z80.0 FAMILY HISTORY OF COLON CANCER: Chronic | ICD-10-CM

## 2020-07-07 DIAGNOSIS — R07.9 CHEST PAIN, UNSPECIFIED TYPE: Chronic | ICD-10-CM

## 2020-07-07 DIAGNOSIS — K76.0 FATTY INFILTRATION OF LIVER: Chronic | ICD-10-CM

## 2020-07-07 DIAGNOSIS — R13.10 DYSPHAGIA, UNSPECIFIED TYPE: Chronic | ICD-10-CM

## 2020-07-07 PROBLEM — K83.8 DILATED CBD, ACQUIRED: Status: RESOLVED | Noted: 2017-06-01 | Resolved: 2020-07-07

## 2020-07-07 PROBLEM — K80.20 CALCULUS OF GALLBLADDER: Status: RESOLVED | Noted: 2017-06-01 | Resolved: 2020-07-07

## 2020-07-07 PROCEDURE — 99214 OFFICE O/P EST MOD 30 MIN: CPT | Performed by: NURSE PRACTITIONER

## 2020-07-07 NOTE — PROGRESS NOTES
Chief Complaint   Patient presents with   • Follow-up     There is a history of recurrent epigastric pain.  The pain has gradually improved.  The pain is described as mild and sharp.  Eating does not affect the pain.  The patient is taking pantoprazole with reasonable control of the pain.     The patient has a history of recurrent pain in her upper chest and her clavicle for the past 7 months or so.  The pain has improved.  Eating does not affect the pain.  The pain is mild and tender to touch.  Movement makes the pain worse.  There is no history of jaw pain or arm pain.  The patient has seen by cardiology, Dr. Dominguez, told there was no evidence of cardiac cause. The patient denies heartburn or reflux.      There is a history of recurrent difficulty swallowing for the past few months.  The patient has difficulty swallowing solids a few times per week.  Swallowing has improved since taking pantoprazole.  There is no history of difficulty swallowing liquids.    The patient denies constipation or diarrhea.  There is no history of bright red blood per rectum or melena.     Fatty infiltration of the liver was noted on ultrasound. CBD was dilated to 10.3 mm in presence of gallbladder. No stones were noted. Liver enzymes are normal. MRCP with no filling defects noted. Noninvasive liver evaluation negative.     The patient's last colonoscopy was in 2017. There is a family history of colon cancer in the patient's brother.    Abdominal Pain   This is a chronic problem. The current episode started more than 1 month ago. The onset quality is gradual. The problem occurs intermittently. The problem has been gradually improving. The pain is located in the epigastric region. The pain is mild. The quality of the pain is sharp. The abdominal pain does not radiate. Associated symptoms include arthralgias. Pertinent negatives include no constipation, diarrhea, dysuria, fever, headaches, hematuria, myalgias, nausea or vomiting. Nothing  aggravates the pain. The pain is relieved by nothing. She has tried proton pump inhibitors for the symptoms. The treatment provided significant relief. Prior diagnostic workup includes ultrasound.   Chest Pain    This is a chronic problem. Episode onset: over 6 months ago. The onset quality is gradual. The problem occurs intermittently. The problem has been rapidly improving. Pain location: clavicle. The pain is mild. Quality: tender to touch. The pain does not radiate. Associated symptoms include back pain and dizziness. Pertinent negatives include no abdominal pain, cough, exertional chest pressure, fever, headaches, nausea, palpitations, shortness of breath or vomiting. The pain is aggravated by movement. Treatments tried: Pantoprazole. The treatment provided significant relief. There are no known risk factors.   Pertinent negatives for past medical history include no seizures.   Difficulty Swallowing   This is a chronic problem. Episode onset: 2-3 months ago. The problem occurs intermittently. The problem has been rapidly improving. Associated symptoms include arthralgias. Pertinent negatives include no abdominal pain, chest pain, chills, coughing, fatigue, fever, headaches, joint swelling, myalgias, nausea, rash or vomiting. The symptoms are aggravated by eating. Treatments tried: Pantoprazole. The treatment provided significant relief.     Review of Systems   Constitutional: Negative for appetite change, chills, fatigue, fever and unexpected weight change.   HENT: Negative for mouth sores, nosebleeds and trouble swallowing.    Eyes: Negative for discharge and redness.   Respiratory: Negative for apnea, cough and shortness of breath.    Cardiovascular: Negative for chest pain, palpitations and leg swelling.   Gastrointestinal: Negative for abdominal distention, abdominal pain, anal bleeding, blood in stool, constipation, diarrhea, nausea and vomiting.   Endocrine: Negative for cold intolerance, heat intolerance  and polydipsia.   Genitourinary: Negative for dysuria, hematuria and urgency.   Musculoskeletal: Positive for arthralgias and back pain. Negative for joint swelling and myalgias.   Skin: Negative for rash.   Allergic/Immunologic: Negative for food allergies and immunocompromised state.   Neurological: Positive for dizziness. Negative for seizures, syncope and headaches.   Hematological: Negative for adenopathy. Does not bruise/bleed easily.   Psychiatric/Behavioral: Negative for dysphoric mood. The patient is not nervous/anxious and is not hyperactive.      Patient Active Problem List   Diagnosis   • Diverticulitis of large intestine   • Fatty infiltration of liver   • History of esophageal reflux   • History of hypertension   • History of headache   • Diverticulosis of colon   • Constipation   • Heartburn   • Nausea   • Family history of colon cancer   • Colon polyps   • Internal hemorrhoids   • Epigastric pain   • Chest pain   • Dysphagia     Past Medical History:   Diagnosis Date   • Calculus of gallbladder     Asymptomatic gallstone   • Chronic airway obstruction (CMS/HCC)    • Colon polyp 2014    Tubular adenomata   • Colon polyps 06/29/2017   • Diverticulitis    • Full dentures    • H/O exercise stress test    • Helicobacter positive gastritis     It appears that the patient had been treated with clarithromycin, Flagyl, and pantoprazole for 14 days and November 2014.   • Hypertension    • UTI (urinary tract infection)    • Vertigo    • Wears glasses     TO DRIVE     Past Surgical History:   Procedure Laterality Date   • COLONOSCOPY  2014   • COLONOSCOPY N/A 6/29/2017    Procedure: COLONOSCOPY WITH HOT BIOPSY POLYPECTOMIES, COLD SNARE POLYPECTOMY;  Surgeon: Abraham Stokes MD;  Location: [facility] ENDOSCOPY;  Service:    • ENDOSCOPY     • HYSTERECTOMY  2000   • UPPER GASTROINTESTINAL ENDOSCOPY  2014     Family History   Problem Relation Age of Onset   • Cancer Sister    • Stomach cancer Sister    • Colon cancer  "Brother    • Arthritis Mother    • Hypertension Mother    • Cirrhosis Neg Hx    • Liver disease Neg Hx    • Crohn's disease Neg Hx    • Liver cancer Neg Hx      Social History     Tobacco Use   • Smoking status: Former Smoker     Packs/day: 0.50     Years: 20.00     Pack years: 10.00     Types: Cigarettes     Start date: 1974     Last attempt to quit: 2/11/2017     Years since quitting: 3.4   • Smokeless tobacco: Never Used   Substance Use Topics   • Alcohol use: No       Current Outpatient Medications:   •  Acetaminophen (TYLENOL PO), Take 1,000 mg by mouth Every 6 (Six) Hours As Needed., Disp: , Rfl:   •  amLODIPine (NORVASC) 10 MG tablet, Take 10 mg by mouth Daily., Disp: , Rfl:   •  ASPIRIN 81 PO, Take 81 mg by mouth Daily., Disp: , Rfl:   •  atenolol (TENORMIN) 25 MG tablet, Take  by mouth daily., Disp: , Rfl:   •  lisinopril (PRINIVIL,ZESTRIL) 40 MG tablet, Take 40 mg by mouth Daily., Disp: , Rfl:   •  loratadine (Claritin) 10 MG tablet, Take 10 mg by mouth Daily., Disp: , Rfl:   •  pantoprazole (PROTONIX) 40 MG EC tablet, 1 po daily in the am 30 minutes before breakfast, Disp: 30 tablet, Rfl: 5    Allergies   Allergen Reactions   • Penicillins Itching     BP (!) 192/79   Pulse 64   Temp 97.5 °F (36.4 °C)   Resp 16   Ht 160 cm (63\")   Wt 72.1 kg (159 lb)   LMP  (LMP Unknown)   BMI 28.17 kg/m²     Physical Exam   Constitutional: She is oriented to person, place, and time. She appears well-developed and well-nourished. No distress.   HENT:   Head: Normocephalic and atraumatic.   Right Ear: Hearing and external ear normal.   Left Ear: Hearing and external ear normal.   Nose: Nose normal.   Mouth/Throat: Oropharynx is clear and moist and mucous membranes are normal. Mucous membranes are not pale, not dry and not cyanotic. No oral lesions. No oropharyngeal exudate.   Eyes: Conjunctivae and EOM are normal. Right eye exhibits no discharge. Left eye exhibits no discharge.   Neck: Trachea normal. Neck supple. No " JVD present. No edema present. No thyroid mass and no thyromegaly present.   Cardiovascular: Normal rate, regular rhythm, S2 normal and normal heart sounds. Exam reveals no gallop, no S3 and no friction rub.   No murmur heard.  Pulmonary/Chest: Effort normal and breath sounds normal. No respiratory distress. She exhibits no tenderness.   Abdominal: Normal appearance and bowel sounds are normal. She exhibits no distension, no ascites and no mass. There is no splenomegaly or hepatomegaly. There is no tenderness. There is no rigidity, no rebound and no guarding. No hernia.     Vascular Status -  Her right foot exhibits no edema. Her left foot exhibits no edema.  Lymphadenopathy:     She has no cervical adenopathy.        Left: No supraclavicular adenopathy present.   Neurological: She is alert and oriented to person, place, and time. She has normal strength. No cranial nerve deficit or sensory deficit.   Skin: No rash noted. She is not diaphoretic. No cyanosis. No pallor. Nails show no clubbing.   Psychiatric: She has a normal mood and affect.   Nursing note and vitals reviewed.  Stigmata of chronic liver disease:  None.  Asterixis:  None.    Laboratory Tests:   Upon review of medical records:    Dated 3/5/2020 glucose 120 potassium 4.9 sodium 143 BUN 15 creatinine 1.08 albumin 4.4 alkaline phosphatase 77 AST 15 ALT 11    Dated 6/18/2020 glucose 118 BUN 15 creatinine 1.28 sodium 142 potassium 4.0 albumin 3.6 total bilirubin 0.3 AST 18 ALT 18 alkaline phosphatase 82 WBC 5.1 hemoglobin 12.6 hematocrit 38.8 platelet count 209 MCV 99, MATT: Negative, microsomal antibody: <1.0, smooth muscle antibody: 20, mitochondrial antibody: <20.0, hepatitis A total antibody: Positive, hepatitis A IgM: Negative, hepatitis B core IgM: Negative, hepatitis B surface antigen: Negative, hepatitis B surface antibody quantitative: <3.1, hepatitis B core total antibody: Negative, hepatitis C antibody: Negative    Abdominal Imaging:  Upon review  of medical records:    Abdominal ultrasound dated 4/12/2016 reveals liver parenchyma is mildly diffusely hyperechoic suggesting fatty infiltration.  There is no focal hepatic mass or biliary dilatation demonstrated.  Gallbladder shows no evidence of cholelithiasis or wall thickening.  There is no pericholecystic fluid.  Limited views of the pancreas are unremarkable.  There is some thinning of the right renal cortex compatible with atrophy.     CT of abdomen and pelvis without contrast dated 5/25/2017 reveals lung bases are clear.  The heart size is normal.  The limited noncontrast images of the liver are normal.  The spleen is normal.  No adrenal masses are seen.  The aorta is normal in caliber.  There aortoiliac vascular calcifications.  There is no significant free fluid.  The right kidney is normal.  There is a punctate stone within the upper pole of the left kidney.  There is no hydronephrosis.  There are several small periportal lymph nodes which are likely reactive measuring up to 11 mm.  There is mild diverticulosis without CT evidence of diverticulitis.  There is L4-L5 disc space narrowing with vacuum disc phenomenon, annular bulge, and foraminal and central canal stenosis.  The appendix is not identified.  Post hysterectomy.  The urinary bladder is decompressed.  There is no significant fluid or adenopathy.    Abdominal ultrasound dated 5/27/2020 reveals visualized pancreas is unremarkable. The liver is echogenic consistent with fatty infiltration. The gallbladder is unremarkable with no shadowing stones or wall thickening. The common duct measures 10.30 mm. The right kidney measures 9.7 cm in length and is normal in echogenicity without hydronephrosis. The left kidney measures 10.2 cm in length and is normal in echogenicity without hydronephrosis. Spleen is unremarkable. The aorta is normal in caliber. The vena cava is unremarkable.    Nuclear medicine HIDA scan dated 5/29/2020 reveals normal  hepatobiliary scan with a gallbladder ejection fraction of 61%.    MRCP abdomen dated 6/30/2020 reveals liver is homogenous without focal abnormality.  The gallbladder appears normal.  No filling defects.  The common bile duct is prominent with prominence of the right main and left hepatic ducts.  No dilatation based on size criteria.  No evidence of Veronique cholelithiasis.  The pancreas, adrenal glands and kidneys are homogenous with normal contours.  The spleen is normal in size.  There is a 10 mm water density structure within the dome of the spleen which is most consistent with a small cyst.  No fluid collection or other abnormality.     Procedures:   Upon review of records:     EGD dated 11/5/2014 reveals subtle concentric rings noted.  Small sliding hiatal hernia less than 3 cm, nonobstructive Schatzki's type ring.  No Arrington's mucosa was seen.  Erythematous distal esophagitis.  Some debris was noted in the esophagus which could easily be me.  Erythematous gastritis. Duodenum second portion biopsy reveals no pathologic alterations, negative for celiac disease.  No microorganisms or atypia identified.  Antrum and body biopsy reveals H. pylori positive.  Chronic active gastritis.  Negative for metaplasia, dysplasia or malignancy.  Esophagus biopsy mid and proximal revealed reflux esophagitis with rare eosinophils.  No metaplasia identified.  Negative for eosinophilic esophagitis, fungus, dysplasia or malignancy.     Colonoscopy dated 11/13/2014 reveals external hemorrhoids.  Internal hemorrhoids.  Small anal tear.  Colon polyps.  Scant early diverticular change seen in the left colon. Transverse colon polyp biopsy reveals tubular adenoma without high-grade dysplasia.  Sigmoid colon polyp and rectal biopsy reveals multiple hyperplastic polyps.     Colonoscopy dated 6/29/2017 reveals scant early diverticular change in the left colon.  Colon polyps.  Internal hemorrhoids.  Descending colon polyp biopsy reveals  tubular adenoma fragments without high-grade dysplasia.  Hepatic flexure polyp reveals tubular adenoma fragments without high-grade dysplasia.  Rectal polyp biopsy reveals hyperplastic polyp.    Assessment:      ICD-10-CM ICD-9-CM   1. Epigastric pain R10.13 789.06   2. Chest pain, unspecified type R07.9 786.50   3. Dysphagia, unspecified type R13.10 787.20   4. Fatty infiltration of liver K76.0 571.8   5. Colon cancer screening Z12.11 V76.51   6. Family history of colon cancer Z80.0 V16.0     Discussion:  1. History of recurrent epigastric pain and chest pain.  Differentials include peptic ulcer disease.  2. History of difficulty swallowing.  Improved.  Differentials include esophagitis, esophageal dysmotility, Schatzki's ring.  3. History of fatty infiltration of liver noted on ultrasound.  Liver enzymes normal.  Noninvasive liver evaluation negative.  4. The patient's last colonoscopy was in 2017 with polyps.  There is a family history of colon cancer in the patient's brother.    Plan/  Patient Instructions   1. Antireflux measures: Avoid fried, fatty foods, alcohol, chocolate, coffee, tea,  soft drinks, peppermint and spearmint, spicy foods, tomatoes and tomato based foods, onion based foods, and smoking. Other antireflux measures include weight reduction if overweight, avoiding tight clothing around the abdomen, elevating the head of the bed 6 inches with blocks under the head board, and don't drink or eat before going to bed and avoid lying down immediately after meals.  2. Pantoprazole 40 mg 1 by mouth in the am 30 minutes before breakfast.  3. High fiber, low fat diet with liberal water intake.   4. Exercise, weight reduction.   5. The patient is not immune to hepatitis B. The patient may obtain immunizations through the health department or PCP office.  6. Upper endoscopy-EGD: Description of the procedure, risks, benefits, alternatives and options, including nonoperative options, were discussed with the  patient in detail. The patient understands and wishes to proceed.  7. Colonoscopy: Description of the procedure, risks, benefits, alternatives and options, including nonoperative options, were discussed with the patient in detail. The patient understands and wishes to proceed.  8. COVID-19 testing prior to procedures. The patient will need to self-quarantine after testing until the procedure. Instructions given to patient.     Hailey Lopez, APRN

## 2020-07-07 NOTE — PATIENT INSTRUCTIONS
1. Antireflux measures: Avoid fried, fatty foods, alcohol, chocolate, coffee, tea,  soft drinks, peppermint and spearmint, spicy foods, tomatoes and tomato based foods, onion based foods, and smoking. Other antireflux measures include weight reduction if overweight, avoiding tight clothing around the abdomen, elevating the head of the bed 6 inches with blocks under the head board, and don't drink or eat before going to bed and avoid lying down immediately after meals.  2. Pantoprazole 40 mg 1 by mouth in the am 30 minutes before breakfast.  3. High fiber, low fat diet with liberal water intake.   4. Exercise, weight reduction.   5. The patient is not immune to hepatitis B. The patient may obtain immunizations through the health department or PCP office.  6. Upper endoscopy-EGD: Description of the procedure, risks, benefits, alternatives and options, including nonoperative options, were discussed with the patient in detail. The patient understands and wishes to proceed.  7. Colonoscopy: Description of the procedure, risks, benefits, alternatives and options, including nonoperative options, were discussed with the patient in detail. The patient understands and wishes to proceed.  8. COVID-19 testing prior to procedures. The patient will need to self-quarantine after testing until the procedure. Instructions given to patient.

## 2020-07-09 ENCOUNTER — PREP FOR SURGERY (OUTPATIENT)
Dept: OTHER | Facility: HOSPITAL | Age: 70
End: 2020-07-09

## 2020-07-09 DIAGNOSIS — Z80.0 FAMILY HISTORY OF COLON CANCER: ICD-10-CM

## 2020-07-09 DIAGNOSIS — R07.9 CHEST PAIN, UNSPECIFIED TYPE: ICD-10-CM

## 2020-07-09 DIAGNOSIS — Z12.11 COLON CANCER SCREENING: ICD-10-CM

## 2020-07-09 DIAGNOSIS — Z01.812 PRE-PROCEDURE LAB EXAM: Primary | ICD-10-CM

## 2020-07-09 DIAGNOSIS — R13.10 DYSPHAGIA, UNSPECIFIED TYPE: ICD-10-CM

## 2020-07-09 DIAGNOSIS — R10.13 EPIGASTRIC PAIN: Primary | ICD-10-CM

## 2020-07-09 RX ORDER — SODIUM CHLORIDE 9 MG/ML
70 INJECTION, SOLUTION INTRAVENOUS CONTINUOUS PRN
Status: CANCELLED | OUTPATIENT
Start: 2020-07-09

## 2020-07-14 ENCOUNTER — LAB (OUTPATIENT)
Dept: LAB | Facility: HOSPITAL | Age: 70
End: 2020-07-14

## 2020-07-14 ENCOUNTER — RESULTS ENCOUNTER (OUTPATIENT)
Dept: GASTROENTEROLOGY | Facility: CLINIC | Age: 70
End: 2020-07-14

## 2020-07-14 DIAGNOSIS — Z01.812 PRE-PROCEDURE LAB EXAM: ICD-10-CM

## 2020-07-14 PROBLEM — Z12.11 COLON CANCER SCREENING: Status: ACTIVE | Noted: 2020-07-14

## 2020-07-14 PROCEDURE — U0004 COV-19 TEST NON-CDC HGH THRU: HCPCS | Performed by: NURSE PRACTITIONER

## 2020-07-14 PROCEDURE — U0002 COVID-19 LAB TEST NON-CDC: HCPCS | Performed by: NURSE PRACTITIONER

## 2020-07-14 PROCEDURE — C9803 HOPD COVID-19 SPEC COLLECT: HCPCS | Performed by: NURSE PRACTITIONER

## 2020-07-15 LAB
REF LAB TEST METHOD: NORMAL
SARS-COV-2 RNA RESP QL NAA+PROBE: NOT DETECTED

## 2020-07-17 ENCOUNTER — ANESTHESIA (OUTPATIENT)
Dept: GASTROENTEROLOGY | Facility: HOSPITAL | Age: 70
End: 2020-07-17

## 2020-07-17 ENCOUNTER — HOSPITAL ENCOUNTER (OUTPATIENT)
Facility: HOSPITAL | Age: 70
Setting detail: HOSPITAL OUTPATIENT SURGERY
Discharge: HOME | End: 2020-07-17
Attending: INTERNAL MEDICINE | Admitting: INTERNAL MEDICINE

## 2020-07-17 ENCOUNTER — ANESTHESIA EVENT (OUTPATIENT)
Dept: GASTROENTEROLOGY | Facility: HOSPITAL | Age: 70
End: 2020-07-17

## 2020-07-17 VITALS
WEIGHT: 156.5 LBS | DIASTOLIC BLOOD PRESSURE: 62 MMHG | BODY MASS INDEX: 27.73 KG/M2 | RESPIRATION RATE: 17 BRPM | OXYGEN SATURATION: 94 % | SYSTOLIC BLOOD PRESSURE: 119 MMHG | HEART RATE: 57 BPM | HEIGHT: 63 IN | TEMPERATURE: 97.8 F

## 2020-07-17 DIAGNOSIS — Z12.11 COLON CANCER SCREENING: ICD-10-CM

## 2020-07-17 DIAGNOSIS — Z80.0 FAMILY HISTORY OF COLON CANCER: ICD-10-CM

## 2020-07-17 PROCEDURE — 88305 TISSUE EXAM BY PATHOLOGIST: CPT | Performed by: INTERNAL MEDICINE

## 2020-07-17 PROCEDURE — 25010000002 PROPOFOL 200 MG/20ML EMULSION: Performed by: NURSE ANESTHETIST, CERTIFIED REGISTERED

## 2020-07-17 RX ORDER — PROPOFOL 10 MG/ML
INJECTION, EMULSION INTRAVENOUS AS NEEDED
Status: DISCONTINUED | OUTPATIENT
Start: 2020-07-17 | End: 2020-07-17 | Stop reason: SURG

## 2020-07-17 RX ORDER — LIDOCAINE HYDROCHLORIDE 20 MG/ML
INJECTION, SOLUTION INTRAVENOUS AS NEEDED
Status: DISCONTINUED | OUTPATIENT
Start: 2020-07-17 | End: 2020-07-17 | Stop reason: SURG

## 2020-07-17 RX ORDER — MAGNESIUM HYDROXIDE 1200 MG/15ML
LIQUID ORAL AS NEEDED
Status: DISCONTINUED | OUTPATIENT
Start: 2020-07-17 | End: 2020-07-17 | Stop reason: HOSPADM

## 2020-07-17 RX ORDER — SODIUM CHLORIDE 9 MG/ML
70 INJECTION, SOLUTION INTRAVENOUS CONTINUOUS PRN
Status: DISCONTINUED | OUTPATIENT
Start: 2020-07-17 | End: 2020-07-17 | Stop reason: HOSPADM

## 2020-07-17 RX ADMIN — PROPOFOL 30 MG: 10 INJECTION, EMULSION INTRAVENOUS at 13:02

## 2020-07-17 RX ADMIN — PROPOFOL 80 MG: 10 INJECTION, EMULSION INTRAVENOUS at 12:47

## 2020-07-17 RX ADMIN — LIDOCAINE HYDROCHLORIDE 60 MG: 20 INJECTION, SOLUTION INTRAVENOUS at 12:47

## 2020-07-17 RX ADMIN — SODIUM CHLORIDE 70 ML/HR: 9 INJECTION, SOLUTION INTRAVENOUS at 11:46

## 2020-07-17 RX ADMIN — PROPOFOL 30 MG: 10 INJECTION, EMULSION INTRAVENOUS at 12:55

## 2020-07-17 NOTE — ANESTHESIA PREPROCEDURE EVALUATION
Anesthesia Evaluation     Patient summary reviewed and Nursing notes reviewed   no history of anesthetic complications:  NPO Solid Status: > 8 hours  NPO Liquid Status: > 8 hours           Airway   Mallampati: I  TM distance: >3 FB  Neck ROM: full  No difficulty expected  Dental - normal exam   (+) upper dentures, lower dentures and edentulous    Pulmonary - normal exam   (+) a smoker Former,     ROS comment: Former smoker 10 pack years - QUIT 2017  Cardiovascular - normal exam    Patient on routine beta blocker and Beta blocker given within 24 hours of surgery    (+) hypertension 2 medications or greater,       Neuro/Psych  (+) dizziness/light headedness,       ROS Comment: vertigo  GI/Hepatic/Renal/Endo    (+)   liver disease fatty liver disease,     Musculoskeletal (-) negative ROS    Abdominal  - normal exam   Substance History - negative use  (-) alcohol use, drug use     OB/GYN negative ob/gyn ROS   (-)  Pregnant    Comment: hysterectomy      Other        ROS/Med Hx Other: Diverticulitis of large intestine  Fatty infiltration of liver  History of esophageal reflux  History of hypertension  History of headache  Diverticulosis of colon  Constipation  Heartburn  Nausea  Family history of colon cancer  Colon polyps  Internal hemorrhoids  Epigastric pain  Chest pain  Dysphagia  Colon cancer screening                      Anesthesia Plan    ASA 2     MAC   (Patient advised that intravenous sedation would be utilized as primary anesthetic technique. Every effort will be made to make sure patient is comfortable. Patient advised that they may experience recall of events of the procedure. Patient verbalized understanding and agreed to plan. )  intravenous induction     Anesthetic plan, all risks, benefits, and alternatives have been provided, discussed and informed consent has been obtained with: patient.    Plan discussed with CRNA.

## 2020-07-17 NOTE — DISCHARGE INSTRUCTIONS
Rest today  No pushing,pulling,tugging,heavy lifting, or strenuous activity   No major decision making,driving,or drinking alcoholic beverages for 24 hours due to the sedation you received  Always use good hand hygiene/washing technique  No driving on pain medication.    To assist you in voiding:  Drink plenty of fluids  Listen to running water while attempting to void.    If you are unable to urinate and you have an uncomfortable urge to void or it has been   6 hours since you were discharged, return to the Emergency Room.        - Discharge patient to home (ambulatory).   - High fiber diet daily.   - Continue present medications.   - Await pathology results.   - Repeat colonoscopy in 5 years for surveillance depend on path.   - Return to GI office in 4 weeks

## 2020-07-17 NOTE — H&P
[facility]  HISTORY AND PHYSICAL    Patient Name: Federica Lay  : 1950  MRN: [id number]    Chief Complaint:   For surveillance colonoscopy    History Of Presenting Illness:    History of colon polyps  Family h/o colon Cancer  Last colon 2014    Past Medical History:   Diagnosis Date   • Calculus of gallbladder     Asymptomatic gallstone   • Colon polyp     Tubular adenomata   • Colon polyps 2017   • Diverticulitis    • Full dentures    • H/O exercise stress test    • Helicobacter positive gastritis     It appears that the patient had been treated with clarithromycin, Flagyl, and pantoprazole for 14 days and 2014.   • Hypertension    • UTI (urinary tract infection)    • Vertigo    • Wears glasses     TO DRIVE       Past Surgical History:   Procedure Laterality Date   • CATARACT EXTRACTION EXTRACAPSULAR W/ INTRAOCULAR LENS IMPLANTATION Bilateral    • COLONOSCOPY     • COLONOSCOPY N/A 2017    Procedure: COLONOSCOPY WITH HOT BIOPSY POLYPECTOMIES, COLD SNARE POLYPECTOMY;  Surgeon: Abraham Stokes MD;  Location: [facility] ENDOSCOPY;  Service:    • ENDOSCOPY     • EYE SURGERY     • HYSTERECTOMY     • UPPER GASTROINTESTINAL ENDOSCOPY         Social History     Socioeconomic History   • Marital status:      Spouse name: Not on file   • Number of children: Not on file   • Years of education: Not on file   • Highest education level: Not on file   Tobacco Use   • Smoking status: Former Smoker     Packs/day: 0.50     Years: 20.00     Pack years: 10.00     Types: Cigarettes     Start date:      Last attempt to quit: 2017     Years since quitting: 3.4   • Smokeless tobacco: Never Used   Substance and Sexual Activity   • Alcohol use: No   • Drug use: No   • Sexual activity: Defer       Family History   Problem Relation Age of Onset   • Cancer Sister    • Stomach cancer Sister    • Colon cancer Brother    • Arthritis Mother    • Hypertension Mother    •  Cirrhosis Neg Hx    • Liver disease Neg Hx    • Crohn's disease Neg Hx    • Liver cancer Neg Hx        Prior to Admission Medications:  Medications Prior to Admission   Medication Sig Dispense Refill Last Dose   • Acetaminophen (TYLENOL PO) Take 1,000 mg by mouth Every 6 (Six) Hours As Needed.   7/16/2020 at 1400   • ASPIRIN 81 PO Take 81 mg by mouth Daily.   7/10/2020   • atenolol (TENORMIN) 25 MG tablet Take  by mouth daily.   7/17/2020 at 0800   • lisinopril (PRINIVIL,ZESTRIL) 40 MG tablet Take 40 mg by mouth Daily.   7/16/2020 at 2000   • loratadine (Claritin) 10 MG tablet Take 10 mg by mouth Daily.   Past Week at Unknown time   • amLODIPine (NORVASC) 10 MG tablet Take 10 mg by mouth Daily.   Taking   • pantoprazole (PROTONIX) 40 MG EC tablet 1 po daily in the am 30 minutes before breakfast (Patient taking differently: Take 40 mg by mouth Daily. 1 po daily in the am 30 minutes before breakfast) 30 tablet 5 Taking       Allergies:  Allergies   Allergen Reactions   • Penicillins Itching        Vitals: Temp:  [98.4 °F (36.9 °C)] 98.4 °F (36.9 °C)  Heart Rate:  [65] 65  Resp:  [16] 16  BP: (158)/(75) 158/75    Review Of Systems:  Constitutional:  Negative for chills, fever, and unexpected weight change.  Respiratory:  Negative for cough, chest tightness, shortness of breath, and wheezing.  Cardiovascular:  Negative for chest pain, palpitations, and leg swelling.  Gastrointestinal:  Negative for abdominal distention, abdominal pain, Nausea, vomiting.  Neurological:  Negative for Weakness, numbness, and headaches.     Physical Exam:    General Appearance:  Alert, cooperative, in no acute distress.   Lungs:   Clear to auscultation, respirations regular, even and                 unlabored.   Heart:  Regular rhythm and normal rate.   Abdomen:   Normal bowel sounds, no masses, no organomegaly. Soft, non-tender, non-distended   Neurologic: Alert and oriented x 3. Moves all four limbs equally       Plan: COLONOSCOPY (N/A)      Jaron Tong MD  7/17/2020

## 2020-07-21 ENCOUNTER — RESULTS ENCOUNTER (OUTPATIENT)
Dept: GASTROENTEROLOGY | Facility: CLINIC | Age: 70
End: 2020-07-21

## 2020-07-21 ENCOUNTER — LAB (OUTPATIENT)
Dept: LAB | Facility: HOSPITAL | Age: 70
End: 2020-07-21

## 2020-07-21 DIAGNOSIS — Z01.812 PRE-PROCEDURE LAB EXAM: ICD-10-CM

## 2020-07-21 LAB
LAB AP CASE REPORT: NORMAL
PATH REPORT.FINAL DX SPEC: NORMAL
REF LAB TEST METHOD: NORMAL
SARS-COV-2 RNA RESP QL NAA+PROBE: NOT DETECTED

## 2020-07-21 PROCEDURE — C9803 HOPD COVID-19 SPEC COLLECT: HCPCS | Performed by: NURSE PRACTITIONER

## 2020-07-21 PROCEDURE — U0002 COVID-19 LAB TEST NON-CDC: HCPCS | Performed by: NURSE PRACTITIONER

## 2020-07-21 PROCEDURE — U0004 COV-19 TEST NON-CDC HGH THRU: HCPCS | Performed by: NURSE PRACTITIONER

## 2020-07-24 ENCOUNTER — ANESTHESIA (OUTPATIENT)
Dept: GASTROENTEROLOGY | Facility: HOSPITAL | Age: 70
End: 2020-07-24

## 2020-07-24 ENCOUNTER — HOSPITAL ENCOUNTER (OUTPATIENT)
Facility: HOSPITAL | Age: 70
Setting detail: HOSPITAL OUTPATIENT SURGERY
Discharge: HOME | End: 2020-07-24
Attending: INTERNAL MEDICINE | Admitting: INTERNAL MEDICINE

## 2020-07-24 ENCOUNTER — ANESTHESIA EVENT (OUTPATIENT)
Dept: GASTROENTEROLOGY | Facility: HOSPITAL | Age: 70
End: 2020-07-24

## 2020-07-24 VITALS
SYSTOLIC BLOOD PRESSURE: 119 MMHG | HEIGHT: 63 IN | HEART RATE: 62 BPM | WEIGHT: 156 LBS | OXYGEN SATURATION: 94 % | RESPIRATION RATE: 18 BRPM | BODY MASS INDEX: 27.64 KG/M2 | TEMPERATURE: 97.7 F | DIASTOLIC BLOOD PRESSURE: 66 MMHG

## 2020-07-24 DIAGNOSIS — R13.10 DYSPHAGIA, UNSPECIFIED TYPE: ICD-10-CM

## 2020-07-24 DIAGNOSIS — R07.9 CHEST PAIN, UNSPECIFIED TYPE: ICD-10-CM

## 2020-07-24 DIAGNOSIS — R10.13 EPIGASTRIC PAIN: ICD-10-CM

## 2020-07-24 PROCEDURE — 88312 SPECIAL STAINS GROUP 1: CPT | Performed by: INTERNAL MEDICINE

## 2020-07-24 PROCEDURE — 25010000002 PROPOFOL 10 MG/ML EMULSION: Performed by: NURSE ANESTHETIST, CERTIFIED REGISTERED

## 2020-07-24 PROCEDURE — 88305 TISSUE EXAM BY PATHOLOGIST: CPT | Performed by: INTERNAL MEDICINE

## 2020-07-24 RX ORDER — LIDOCAINE HYDROCHLORIDE 20 MG/ML
INJECTION, SOLUTION INTRAVENOUS AS NEEDED
Status: DISCONTINUED | OUTPATIENT
Start: 2020-07-24 | End: 2020-07-24 | Stop reason: SURG

## 2020-07-24 RX ORDER — SODIUM CHLORIDE 9 MG/ML
70 INJECTION, SOLUTION INTRAVENOUS CONTINUOUS PRN
Status: DISCONTINUED | OUTPATIENT
Start: 2020-07-24 | End: 2020-07-24 | Stop reason: HOSPADM

## 2020-07-24 RX ORDER — PROPOFOL 10 MG/ML
VIAL (ML) INTRAVENOUS AS NEEDED
Status: DISCONTINUED | OUTPATIENT
Start: 2020-07-24 | End: 2020-07-24 | Stop reason: SURG

## 2020-07-24 RX ADMIN — PROPOFOL 100 MG: 10 INJECTION, EMULSION INTRAVENOUS at 08:21

## 2020-07-24 RX ADMIN — PROPOFOL 100 MG: 10 INJECTION, EMULSION INTRAVENOUS at 08:16

## 2020-07-24 RX ADMIN — LIDOCAINE HYDROCHLORIDE 60 MG: 20 INJECTION, SOLUTION INTRAVENOUS at 08:16

## 2020-07-24 RX ADMIN — SODIUM CHLORIDE 70 ML/HR: 9 INJECTION, SOLUTION INTRAVENOUS at 06:46

## 2020-07-24 NOTE — ANESTHESIA POSTPROCEDURE EVALUATION
Patient: Federica Lay    Procedure Summary     Date:  07/24/20 Room / Location:  [facility] ENDOSCOPY 1 / [facility] ENDOSCOPY    Anesthesia Start:  0813 Anesthesia Stop:      Procedure:  ESOPHAGOGASTRODUODENOSCOPY (N/A Esophagus) Diagnosis:       Epigastric pain      Chest pain, unspecified type      Dysphagia, unspecified type      (Epigastric pain [R10.13])      (Chest pain, unspecified type [R07.9])      (Dysphagia, unspecified type [R13.10])    Surgeon:  Jaron Tong MD Provider:  Dhaval Burciaga CRNA    Anesthesia Type:  MAC ASA Status:  2          Anesthesia Type: MAC    Vitals  HR 60  Sat 93  /55  Resp 13  Temp 97        Post Anesthesia Care and Evaluation    Patient location during evaluation: bedside  Patient participation: complete - patient participated  Level of consciousness: awake and alert  Pain score: 0  Pain management: adequate  Airway patency: patent  Anesthetic complications: No anesthetic complications  PONV Status: none  Cardiovascular status: acceptable  Respiratory status: nasal cannula  Hydration status: acceptable

## 2020-07-24 NOTE — H&P
[facility]  HISTORY AND PHYSICAL    Patient Name: Federica Lay  : 1950  MRN: [id number]    Chief Complaint:   For EGD    History Of Presenting Illness:      Upper abdominal pain,   Chest pain  Dysphagia    Past Medical History:   Diagnosis Date   • Calculus of gallbladder     Asymptomatic gallstone   • Colon polyp     Tubular adenomata   • Colon polyps 2017   • Diverticulitis    • Full dentures    • H/O exercise stress test    • Helicobacter positive gastritis     It appears that the patient had been treated with clarithromycin, Flagyl, and pantoprazole for 14 days and 2014.   • Hypertension    • UTI (urinary tract infection)    • Vertigo    • Wears glasses     TO DRIVE       Past Surgical History:   Procedure Laterality Date   • CATARACT EXTRACTION EXTRACAPSULAR W/ INTRAOCULAR LENS IMPLANTATION Bilateral    • COLONOSCOPY     • COLONOSCOPY N/A 2017    Procedure: COLONOSCOPY WITH HOT BIOPSY POLYPECTOMIES, COLD SNARE POLYPECTOMY;  Surgeon: Abraham Stokes MD;  Location: [facility] ENDOSCOPY;  Service:    • COLONOSCOPY N/A 2020    Procedure: COLONOSCOPY;  Surgeon: Jaron Tong MD;  Location: [facility] ENDOSCOPY;  Service: Gastroenterology;  Laterality: N/A;   • ENDOSCOPY     • EYE SURGERY     • HYSTERECTOMY     • UPPER GASTROINTESTINAL ENDOSCOPY         Social History     Socioeconomic History   • Marital status:      Spouse name: Not on file   • Number of children: Not on file   • Years of education: Not on file   • Highest education level: Not on file   Tobacco Use   • Smoking status: Former Smoker     Packs/day: 0.50     Years: 20.00     Pack years: 10.00     Types: Cigarettes     Start date:      Last attempt to quit: 2017     Years since quitting: 3.4   • Smokeless tobacco: Never Used   Substance and Sexual Activity   • Alcohol use: No   • Drug use: No   • Sexual activity: Defer       Family History   Problem Relation Age of  Onset   • Cancer Sister    • Stomach cancer Sister    • Colon cancer Brother    • Arthritis Mother    • Hypertension Mother    • Cirrhosis Neg Hx    • Liver disease Neg Hx    • Crohn's disease Neg Hx    • Liver cancer Neg Hx        Prior to Admission Medications:  Medications Prior to Admission   Medication Sig Dispense Refill Last Dose   • Acetaminophen (TYLENOL PO) Take 1,000 mg by mouth Every 6 (Six) Hours As Needed.   Past Week at Unknown time   • ASPIRIN 81 PO Take 81 mg by mouth Daily.   Past Week at Unknown time   • atenolol (TENORMIN) 25 MG tablet Take  by mouth daily.   7/24/2020 at 0600   • lisinopril (PRINIVIL,ZESTRIL) 40 MG tablet Take 40 mg by mouth Daily.   7/23/2020 at 2000   • loratadine (Claritin) 10 MG tablet Take 10 mg by mouth Daily.   Past Week at Unknown time   • pantoprazole (PROTONIX) 40 MG EC tablet 1 po daily in the am 30 minutes before breakfast (Patient taking differently: Take 40 mg by mouth Daily. 1 po daily in the am 30 minutes before breakfast) 30 tablet 5 Past Week at Unknown time       Allergies:  Allergies   Allergen Reactions   • Penicillins Itching        Vitals: Temp:  [97.8 °F (36.6 °C)] 97.8 °F (36.6 °C)  Heart Rate:  [63] 63  Resp:  [18] 18    Review Of Systems:  Constitutional:  Negative for chills, fever, and unexpected weight change.  Respiratory:  Negative for cough, chest tightness, shortness of breath, and wheezing.  Cardiovascular:  Negative for chest pain, palpitations, and leg swelling.  Gastrointestinal:  Negative for abdominal distention, abdominal pain, Nausea, vomiting.  Neurological:  Negative for Weakness, numbness, and headaches.     Physical Exam:    General Appearance:  Alert, cooperative, in no acute distress.   Lungs:   Clear to auscultation, respirations regular, even and                 unlabored.   Heart:  Regular rhythm and normal rate.   Abdomen:   Normal bowel sounds, no masses, no organomegaly. Soft, non-tender, non-distended   Neurologic: Alert and  oriented x 3. Moves all four limbs equally       Plan: ESOPHAGOGASTRODUODENOSCOPY (N/A)     Jaron Tong MD  7/24/2020

## 2020-07-24 NOTE — DISCHARGE INSTRUCTIONS
- Discharge patient to home (ambulatory).   - Resume previous diet.   - Continue present medications.   - Follow an antireflux regimen.   - Await pathology results.   - Return to GI office- Follow up with Hailey in 4 weeks

## 2020-07-29 LAB
LAB AP CASE REPORT: NORMAL
PATH REPORT.FINAL DX SPEC: NORMAL

## 2020-08-19 ENCOUNTER — OFFICE VISIT (OUTPATIENT)
Dept: GASTROENTEROLOGY | Facility: CLINIC | Age: 70
End: 2020-08-19

## 2020-08-19 VITALS
DIASTOLIC BLOOD PRESSURE: 90 MMHG | HEART RATE: 68 BPM | TEMPERATURE: 97.5 F | WEIGHT: 159 LBS | SYSTOLIC BLOOD PRESSURE: 180 MMHG | OXYGEN SATURATION: 97 % | BODY MASS INDEX: 28.17 KG/M2 | HEIGHT: 63 IN

## 2020-08-19 DIAGNOSIS — K21.9 GASTROESOPHAGEAL REFLUX DISEASE WITHOUT ESOPHAGITIS: ICD-10-CM

## 2020-08-19 DIAGNOSIS — R10.13 EPIGASTRIC PAIN: Primary | ICD-10-CM

## 2020-08-19 DIAGNOSIS — D12.6 ADENOMATOUS POLYP OF COLON, UNSPECIFIED PART OF COLON: ICD-10-CM

## 2020-08-19 PROCEDURE — 99213 OFFICE O/P EST LOW 20 MIN: CPT | Performed by: INTERNAL MEDICINE

## 2020-08-19 NOTE — PATIENT INSTRUCTIONS
Food Choices for Gastroesophageal Reflux Disease, Adult  When you have gastroesophageal reflux disease (GERD), the foods you eat and your eating habits are very important. Choosing the right foods can help ease the discomfort of GERD. Consider working with a diet and nutrition specialist (dietitian) to help you make healthy food choices.  What general guidelines should I follow?    Eating plan  · Choose healthy foods low in fat, such as fruits, vegetables, whole grains, low-fat dairy products, and lean meat, fish, and poultry.  · Eat frequent, small meals instead of three large meals each day. Eat your meals slowly, in a relaxed setting. Avoid bending over or lying down until 2-3 hours after eating.  · Limit high-fat foods such as fatty meats or fried foods.  · Limit your intake of oils, butter, and shortening to less than 8 teaspoons each day.  · Avoid the following:  ? Foods that cause symptoms. These may be different for different people. Keep a food diary to keep track of foods that cause symptoms.  ? Alcohol.  ? Drinking large amounts of liquid with meals.  ? Eating meals during the 2-3 hours before bed.  · Cook foods using methods other than frying. This may include baking, grilling, or broiling.  Lifestyle  · Maintain a healthy weight. Ask your health care provider what weight is healthy for you. If you need to lose weight, work with your health care provider to do so safely.  · Exercise for at least 30 minutes on 5 or more days each week, or as told by your health care provider.  · Avoid wearing clothes that fit tightly around your waist and chest.  · Do not use any products that contain nicotine or tobacco, such as cigarettes and e-cigarettes. If you need help quitting, ask your health care provider.  · Sleep with the head of your bed raised. Use a wedge under the mattress or blocks under the bed frame to raise the head of the bed.  What foods are not recommended?  The items listed may not be a complete  list. Talk with your dietitian about what dietary choices are best for you.  Grains  Pastries or quick breads with added fat. French toast.  Vegetables  Deep fried vegetables. French fries. Any vegetables prepared with added fat. Any vegetables that cause symptoms. For some people this may include tomatoes and tomato products, chili peppers, onions and garlic, and horseradish.  Fruits  Any fruits prepared with added fat. Any fruits that cause symptoms. For some people this may include citrus fruits, such as oranges, grapefruit, pineapple, and rachel.  Meats and other protein foods  High-fat meats, such as fatty beef or pork, hot dogs, ribs, ham, sausage, salami and monahan. Fried meat or protein, including fried fish and fried chicken. Nuts and nut butters.  Dairy  Whole milk and chocolate milk. Sour cream. Cream. Ice cream. Cream cheese. Milk shakes.  Beverages  Coffee and tea, with or without caffeine. Carbonated beverages. Sodas. Energy drinks. Fruit juice made with acidic fruits (such as orange or grapefruit). Tomato juice. Alcoholic drinks.  Fats and oils  Butter. Margarine. Shortening. Ghee.  Sweets and desserts  Chocolate and cocoa. Donuts.  Seasoning and other foods  Pepper. Peppermint and spearmint. Any condiments, herbs, or seasonings that cause symptoms. For some people, this may include sinclair, hot sauce, or vinegar-based salad dressings.  Summary  · When you have gastroesophageal reflux disease (GERD), food and lifestyle choices are very important to help ease the discomfort of GERD.  · Eat frequent, small meals instead of three large meals each day. Eat your meals slowly, in a relaxed setting. Avoid bending over or lying down until 2-3 hours after eating.  · Limit high-fat foods such as fatty meat or fried foods.  This information is not intended to replace advice given to you by your health care provider. Make sure you discuss any questions you have with your health care provider.  Document Released:  12/18/2006 Document Revised: 04/09/2020 Document Reviewed: 12/19/2017  [employer]vier Patient Education © 2020 [employer]

## 2020-08-19 NOTE — PROGRESS NOTES
Follow Up Note     Date: 2020   Patient Name: Federica Lay  MRN: [id number]  : 1950     Referring Physician: No ref. provider found    Chief Complaint:    Chief Complaint   Patient presents with   • Follow-up   • Abdominal Pain   • Difficulty Swallowing       Interval History:   2020  Federica Lay is a 69 y.o. female who is here today for follow up for her upper abdominal pain reflux symptoms in the difficulty in swallowing.   She states that her upper abdominal pain still persists despite on a PPI.  She also has intermittent reflux symptoms.  She is here to discuss the EGD findings and also the colonoscopy findings and biopsy reports.    2020  There is a history of recurrent epigastric pain.  The pain has gradually improved.  The pain is described as mild and sharp.  Eating does not affect the pain.  The patient is taking pantoprazole with reasonable control of the pain.      The patient has a history of recurrent pain in her upper chest and her clavicle for the past 7 months or so.  The pain has improved.  Eating does not affect the pain.  The pain is mild and tender to touch.  Movement makes the pain worse.  There is no history of jaw pain or arm pain.  The patient has seen by cardiology, Dr. Dominguez, told there was no evidence of cardiac cause. The patient denies heartburn or reflux.       There is a history of recurrent difficulty swallowing for the past few months.  The patient has difficulty swallowing solids a few times per week.  Swallowing has improved since taking pantoprazole.  There is no history of difficulty swallowing liquids.  The patient denies constipation or diarrhea.  There is no history of bright red blood per rectum or melena.  Fatty infiltration of the liver was noted on ultrasound. CBD was dilated to 10.3 mm in presence of gallbladder. No stones were noted. Liver enzymes are normal. MRCP with no filling defects noted. Noninvasive liver evaluation  negative.     The patient's last colonoscopy was in 2017. There is a family history of colon cancer in the patient's brother.       Subjective      Past Medical History:   Past Medical History:   Diagnosis Date   • Calculus of gallbladder     Asymptomatic gallstone   • Colon polyp 2014    Tubular adenomata   • Colon polyps 06/29/2017   • Diverticulitis    • Full dentures    • H/O exercise stress test    • Helicobacter positive gastritis     It appears that the patient had been treated with clarithromycin, Flagyl, and pantoprazole for 14 days and November 2014.   • Hypertension    • UTI (urinary tract infection)    • Vertigo    • Wears glasses     TO DRIVE     Past Surgical History:   Past Surgical History:   Procedure Laterality Date   • CATARACT EXTRACTION EXTRACAPSULAR W/ INTRAOCULAR LENS IMPLANTATION Bilateral    • COLONOSCOPY  2014   • COLONOSCOPY N/A 6/29/2017    Procedure: COLONOSCOPY WITH HOT BIOPSY POLYPECTOMIES, COLD SNARE POLYPECTOMY;  Surgeon: Abraham Stokes MD;  Location: [facility] ENDOSCOPY;  Service:    • COLONOSCOPY N/A 7/17/2020    Procedure: COLONOSCOPY;  Surgeon: Jaron Tong MD;  Location: [facility] ENDOSCOPY;  Service: Gastroenterology;  Laterality: N/A;   • ENDOSCOPY     • ENDOSCOPY N/A 7/24/2020    Procedure: ESOPHAGOGASTRODUODENOSCOPY W/ BIOPSIES;  Surgeon: Jaron Tong MD;  Location: [facility] ENDOSCOPY;  Service: Gastroenterology;  Laterality: N/A;   • EYE SURGERY     • HYSTERECTOMY  2000   • UPPER GASTROINTESTINAL ENDOSCOPY  2014       Family History:   Family History   Problem Relation Age of Onset   • Cancer Sister    • Stomach cancer Sister    • Colon cancer Brother    • Arthritis Mother    • Hypertension Mother    • Cirrhosis Neg Hx    • Liver disease Neg Hx    • Crohn's disease Neg Hx    • Liver cancer Neg Hx        Social History:   Social History     Socioeconomic History   • Marital status:      Spouse name: Not on file   • Number of children: Not on file   •  Years of education: Not on file   • Highest education level: Not on file   Tobacco Use   • Smoking status: Former Smoker     Packs/day: 0.50     Years: 20.00     Pack years: 10.00     Types: Cigarettes     Start date: 1974     Last attempt to quit: 2/11/2017     Years since quitting: 3.5   • Smokeless tobacco: Never Used   Substance and Sexual Activity   • Alcohol use: No   • Drug use: No   • Sexual activity: Defer       Medications:     Current Outpatient Medications:   •  Acetaminophen (TYLENOL PO), Take 1,000 mg by mouth Every 6 (Six) Hours As Needed., Disp: , Rfl:   •  ASPIRIN 81 PO, Take 81 mg by mouth Daily., Disp: , Rfl:   •  atenolol (TENORMIN) 25 MG tablet, Take  by mouth daily., Disp: , Rfl:   •  lisinopril (PRINIVIL,ZESTRIL) 40 MG tablet, Take 40 mg by mouth Daily., Disp: , Rfl:   •  loratadine (Claritin) 10 MG tablet, Take 10 mg by mouth Daily., Disp: , Rfl:   •  pantoprazole (PROTONIX) 40 MG EC tablet, 1 po daily in the am 30 minutes before breakfast (Patient taking differently: Take 40 mg by mouth Daily. 1 po daily in the am 30 minutes before breakfast), Disp: 30 tablet, Rfl: 5    Allergies:   Allergies   Allergen Reactions   • Penicillins Itching       Review of Systems:   Review of Systems   Constitutional: Negative for appetite change, fatigue and unexpected weight loss.   HENT: Positive for sore throat and trouble swallowing.    Gastrointestinal: Positive for GERD. Negative for abdominal distention, abdominal pain, anal bleeding, blood in stool, constipation, diarrhea, nausea, rectal pain, vomiting and indigestion.       The following portions of the patient's history were reviewed and updated as appropriate: allergies, current medications, past family history, past medical history, past social history, past surgical history and problem list.    Objective     Physical Exam:  Vital Signs:   Vitals:    08/19/20 1326   BP: 180/90   Pulse: 68   Temp: 97.5 °F (36.4 °C)   SpO2: 97%  Comment: room air  "  Weight: 72.1 kg (159 lb)   Height: 160 cm (63\")       Physical Exam   Constitutional: She is oriented to person, place, and time. She appears well-developed and well-nourished.   HENT:   Mouth/Throat: Oropharynx is clear and moist.   Eyes: Conjunctivae and EOM are normal.   Neck: Neck supple.   Cardiovascular: Normal rate and regular rhythm.   No murmur heard.  Pulmonary/Chest: Effort normal and breath sounds normal. No respiratory distress.   Abdominal: Soft. Bowel sounds are normal. She exhibits no distension and no mass. There is no tenderness. No hernia.   Lymphadenopathy:     She has no cervical adenopathy.   Neurological: She is alert and oriented to person, place, and time. No sensory deficit.   Skin: Skin is warm and dry.   Nursing note and vitals reviewed.      Results Review:   I reviewed the patient's new clinical results.    Admission on 07/24/2020, Discharged on 07/24/2020   Component Date Value Ref Range Status   • Case Report 07/24/2020    Final                    Value:Surgical Pathology Report                         Case: [id number]                                  Authorizing Provider:  Jaron Tong MD  Collected:           07/24/2020 08:19 AM          Ordering Location:     [facility]    Received:            07/24/2020 08:51 AM                                 [facility]                                                                    Pathologist:           Darrel Hernandez MD                                                            Specimens:   1) - Esophagus, gastric biopsies for h pylori                                                       2) - Esophagus, esophageal biopsies                                                       • Final Diagnosis 07/24/2020    Final                    Value:This result contains rich text formatting which cannot be displayed here.   Results Encounter on 07/21/2020   Component Date Value Ref Range Status   • Reference Lab Report " 07/21/2020 See Scanned Report   Final   • COVID19 07/21/2020 Not Detected  Not Detected - Ref. Range Final   Admission on 07/17/2020, Discharged on 07/17/2020   Component Date Value Ref Range Status   • Case Report 07/17/2020    Final                    Value:Surgical Pathology Report                         Case: [id number]                                  Authorizing Provider:  Jaron Tong MD  Collected:           07/17/2020 12:57 PM          Ordering Location:     [facility]    Received:            07/17/2020 01:44 PM                                 [facility]                                                                    Pathologist:           Darrel Hernandez MD                                                            Specimens:   1) - Large Intestine, hepatic flexure polyp                                                         2) - Large Intestine, hepatic flexure polyp #2                                                      3) - Large Intestine, Rectum, rectal polyp                                                • Final Diagnosis 07/17/2020    Final                    Value:This result contains rich text formatting which cannot be displayed here.   Results Encounter on 07/14/2020   Component Date Value Ref Range Status   • Reference Lab Report 07/14/2020 See attachment   Final   • COVID19 07/14/2020 Not Detected  Not Detected - Ref. Range Final      Us Abdomen Complete    Result Date: 5/27/2020  1. Fatty infiltration liver. 2. Nonspecific dilatation of the common bile duct with no evidence of gallstones or intrahepatic biliary dilatation.  This report was finalized on 5/27/2020 10:36 AM by Lewis Flores M.D..    Nm Hida Scan With Pharmacological Intervention    Result Date: 5/29/2020  Normal hepatobiliary scan with a gallbladder ejection fraction of 61%.  This report was finalized on 5/29/2020 12:44 PM by Lewis Flores M.D..      Assessment / Plan      1.  Dysphagia  This  patient mostly has a globus feeling no obvious dysphagia  She had an EGD done on 7/24/2020 which did not reveal any esophageal abnormalities.  Random esophageal biopsies did not reveal any signs of EOE.  Gastric biopsies were negative for any H. pylori infection.  She has a significant acid reflux with her bad diet  We will continue PPI p.o. Daily, I have advised her to cut down or stop carbonated beverages  We will monitor for now    2.  Epigastric pain/GERD  Recent EGD did not reveal any significant erosive esophagitis or gastric pathology . Patient drinks at least a 2 to 3 L of Coke every day.  That is significantly contributing to her abdominal discomfort and reflux symptoms  Had a detailed discussion on that today.  We will continue PPI and follow-up in 1 year time    3.  Nonalcoholic fatty liver disease  Her CT scan abdomen pelvis and ultrasound done in the past revealed fatty liver.  She is nonalcoholic  Her liver enzymes are normal.  Discussed on losing 5 to 10 pounds and she needs a CMP every year    3.  Personal history of colon polyps  Colonoscopy done on 7/17/2020 revealed 3 colon polyps less than 1 cm.  1 of the polyps was a tubular adenoma and 2 of them are hyperplastic polyp  She needs a surveillance colonoscopy in 5 years time that would be in July 2025    4.  Family history of colon cancer      Prior history of  Laboratory Tests:   Upon review of medical records:     Dated 3/5/2020 glucose 120 potassium 4.9 sodium 143 BUN 15 creatinine 1.08 albumin 4.4 alkaline phosphatase 77 AST 15 ALT 11     Dated 6/18/2020 glucose 118 BUN 15 creatinine 1.28 sodium 142 potassium 4.0 albumin 3.6 total bilirubin 0.3 AST 18 ALT 18 alkaline phosphatase 82 WBC 5.1 hemoglobin 12.6 hematocrit 38.8 platelet count 209 MCV 99, MATT: Negative, microsomal antibody: <1.0, smooth muscle antibody: 20, mitochondrial antibody: <20.0, hepatitis A total antibody: Positive, hepatitis A IgM: Negative, hepatitis B core IgM: Negative,  hepatitis B surface antigen: Negative, hepatitis B surface antibody quantitative: <3.1, hepatitis B core total antibody: Negative, hepatitis C antibody: Negative     Abdominal Imaging:  Upon review of medical records:     Abdominal ultrasound dated 4/12/2016 reveals liver parenchyma is mildly diffusely hyperechoic suggesting fatty infiltration.  There is no focal hepatic mass or biliary dilatation demonstrated.  Gallbladder shows no evidence of cholelithiasis or wall thickening.  There is no pericholecystic fluid.  Limited views of the pancreas are unremarkable.  There is some thinning of the right renal cortex compatible with atrophy.     CT of abdomen and pelvis without contrast dated 5/25/2017 reveals lung bases are clear.  The heart size is normal.  The limited noncontrast images of the liver are normal.  The spleen is normal.  No adrenal masses are seen.  The aorta is normal in caliber.  There aortoiliac vascular calcifications.  There is no significant free fluid.  The right kidney is normal.  There is a punctate stone within the upper pole of the left kidney.  There is no hydronephrosis.  There are several small periportal lymph nodes which are likely reactive measuring up to 11 mm.  There is mild diverticulosis without CT evidence of diverticulitis.  There is L4-L5 disc space narrowing with vacuum disc phenomenon, annular bulge, and foraminal and central canal stenosis.  The appendix is not identified.  Post hysterectomy.  The urinary bladder is decompressed.  There is no significant fluid or adenopathy.     Abdominal ultrasound dated 5/27/2020 reveals visualized pancreas is unremarkable. The liver is echogenic consistent with fatty infiltration. The gallbladder is unremarkable with no shadowing stones or wall thickening. The common duct measures 10.30 mm. The right kidney measures 9.7 cm in length and is normal in echogenicity without hydronephrosis. The left kidney measures 10.2 cm in length and is normal  in echogenicity without hydronephrosis. Spleen is unremarkable. The aorta is normal in caliber. The vena cava is unremarkable.     Nuclear medicine HIDA scan dated 5/29/2020 reveals normal hepatobiliary scan with a gallbladder ejection fraction of 61%.     MRCP abdomen dated 6/30/2020 reveals liver is homogenous without focal abnormality.  The gallbladder appears normal.  No filling defects.  The common bile duct is prominent with prominence of the right main and left hepatic ducts.  No dilatation based on size criteria.  No evidence of Veronique cholelithiasis.  The pancreas, adrenal glands and kidneys are homogenous with normal contours.  The spleen is normal in size.  There is a 10 mm water density structure within the dome of the spleen which is most consistent with a small cyst.  No fluid collection or other abnormality.     Procedures:   Upon review of records:     EGD dated 11/5/2014 reveals subtle concentric rings noted.  Small sliding hiatal hernia less than 3 cm, nonobstructive Schatzki's type ring.  No Arrington's mucosa was seen.  Erythematous distal esophagitis.  Some debris was noted in the esophagus which could easily be me.  Erythematous gastritis. Duodenum second portion biopsy reveals no pathologic alterations, negative for celiac disease.  No microorganisms or atypia identified.  Antrum and body biopsy reveals H. pylori positive.  Chronic active gastritis.  Negative for metaplasia, dysplasia or malignancy.  Esophagus biopsy mid and proximal revealed reflux esophagitis with rare eosinophils.  No metaplasia identified.  Negative for eosinophilic esophagitis, fungus, dysplasia or malignancy.     Colonoscopy dated 11/13/2014 reveals external hemorrhoids.  Internal hemorrhoids.  Small anal tear.  Colon polyps.  Scant early diverticular change seen in the left colon. Transverse colon polyp biopsy reveals tubular adenoma without high-grade dysplasia.  Sigmoid colon polyp and rectal biopsy reveals multiple  hyperplastic polyps.     Colonoscopy dated 6/29/2017 reveals scant early diverticular change in the left colon.  Colon polyps.  Internal hemorrhoids.  Descending colon polyp biopsy reveals tubular adenoma fragments without high-grade dysplasia.  Hepatic flexure polyp reveals tubular adenoma fragments without high-grade dysplasia.  Rectal polyp biopsy reveals hyperplastic polyp.    Follow Up:   No follow-ups on file.    Jaron Tong MD  [facility]  8/19/2020  13:29     Please note that portions of this note may have been completed with a voice recognition program.

## 2020-10-02 ENCOUNTER — TRANSCRIBE ORDERS (OUTPATIENT)
Dept: ADMINISTRATIVE | Facility: HOSPITAL | Age: 70
End: 2020-10-02

## 2020-10-02 DIAGNOSIS — Z87.891 PERSONAL HISTORY OF TOBACCO USE, PRESENTING HAZARDS TO HEALTH: Primary | ICD-10-CM

## 2020-10-14 ENCOUNTER — HOSPITAL ENCOUNTER (OUTPATIENT)
Dept: CT IMAGING | Facility: HOSPITAL | Age: 70
Discharge: HOME | End: 2020-10-14
Admitting: NURSE PRACTITIONER

## 2020-10-14 DIAGNOSIS — Z87.891 PERSONAL HISTORY OF TOBACCO USE, PRESENTING HAZARDS TO HEALTH: ICD-10-CM

## 2020-10-14 PROCEDURE — G0297 LDCT FOR LUNG CA SCREEN: HCPCS

## 2021-05-04 ENCOUNTER — OFFICE VISIT (OUTPATIENT)
Dept: FAMILY MEDICINE CLINIC | Facility: CLINIC | Age: 71
End: 2021-05-04

## 2021-05-04 VITALS
BODY MASS INDEX: 28.17 KG/M2 | DIASTOLIC BLOOD PRESSURE: 80 MMHG | OXYGEN SATURATION: 96 % | SYSTOLIC BLOOD PRESSURE: 140 MMHG | WEIGHT: 159 LBS | HEIGHT: 63 IN | HEART RATE: 74 BPM | RESPIRATION RATE: 20 BRPM | TEMPERATURE: 96.9 F

## 2021-05-04 DIAGNOSIS — I10 ESSENTIAL HYPERTENSION: Primary | ICD-10-CM

## 2021-05-04 DIAGNOSIS — K76.0 FATTY LIVER: ICD-10-CM

## 2021-05-04 DIAGNOSIS — G89.29 CHRONIC LEFT-SIDED LOW BACK PAIN WITH LEFT-SIDED SCIATICA: ICD-10-CM

## 2021-05-04 DIAGNOSIS — Z13.820 SCREENING FOR OSTEOPOROSIS: ICD-10-CM

## 2021-05-04 DIAGNOSIS — M54.42 CHRONIC LEFT-SIDED LOW BACK PAIN WITH LEFT-SIDED SCIATICA: ICD-10-CM

## 2021-05-04 DIAGNOSIS — Z78.0 POST-MENOPAUSAL: ICD-10-CM

## 2021-05-04 DIAGNOSIS — H92.02 LEFT EAR PAIN: ICD-10-CM

## 2021-05-04 PROBLEM — K64.8 INTERNAL HEMORRHOIDS: Status: RESOLVED | Noted: 2017-08-21 | Resolved: 2021-05-04

## 2021-05-04 PROBLEM — R12 HEARTBURN: Status: RESOLVED | Noted: 2017-06-01 | Resolved: 2021-05-04

## 2021-05-04 PROBLEM — K63.5 COLON POLYPS: Status: RESOLVED | Noted: 2017-08-21 | Resolved: 2021-05-04

## 2021-05-04 PROCEDURE — 99214 OFFICE O/P EST MOD 30 MIN: CPT | Performed by: NURSE PRACTITIONER

## 2021-05-04 RX ORDER — CLINDAMYCIN HYDROCHLORIDE 300 MG/1
CAPSULE ORAL
COMMUNITY
Start: 2021-04-15 | End: 2021-05-04
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
## 2021-05-05 LAB
ALBUMIN SERPL-MCNC: 4.4 G/DL (ref 3.7–4.7)
ALBUMIN/GLOB SERPL: 1.9 {RATIO} (ref 1.2–2.2)
ALP SERPL-CCNC: 83 IU/L (ref 39–117)
ALT SERPL-CCNC: 9 IU/L (ref 0–32)
AST SERPL-CCNC: 14 IU/L (ref 0–40)
BILIRUB SERPL-MCNC: 0.2 MG/DL (ref 0–1.2)
BUN SERPL-MCNC: 10 MG/DL (ref 8–27)
BUN/CREAT SERPL: 10 (ref 12–28)
CALCIUM SERPL-MCNC: 9.4 MG/DL (ref 8.7–10.3)
CHLORIDE SERPL-SCNC: 101 MMOL/L (ref 96–106)
CHOLEST SERPL-MCNC: 209 MG/DL (ref 100–199)
CO2 SERPL-SCNC: 25 MMOL/L (ref 20–29)
CREAT SERPL-MCNC: 1.02 MG/DL (ref 0.57–1)
ERYTHROCYTE [DISTWIDTH] IN BLOOD BY AUTOMATED COUNT: 12.3 % (ref 11.7–15.4)
GLOBULIN SER CALC-MCNC: 2.3 G/DL (ref 1.5–4.5)
GLUCOSE SERPL-MCNC: 100 MG/DL (ref 65–99)
HCT VFR BLD AUTO: 36.5 % (ref 34–46.6)
HDLC SERPL-MCNC: 31 MG/DL
HGB BLD-MCNC: 12.9 G/DL (ref 11.1–15.9)
LDLC SERPL CALC-MCNC: 123 MG/DL (ref 0–99)
MCH RBC QN AUTO: 33.2 PG (ref 26.6–33)
MCHC RBC AUTO-ENTMCNC: 35.3 G/DL (ref 31.5–35.7)
MCV RBC AUTO: 94 FL (ref 79–97)
PLATELET # BLD AUTO: 220 X10E3/UL (ref 150–450)
POTASSIUM SERPL-SCNC: 4.5 MMOL/L (ref 3.5–5.2)
PROT SERPL-MCNC: 6.7 G/DL (ref 6–8.5)
RBC # BLD AUTO: 3.88 X10E6/UL (ref 3.77–5.28)
SODIUM SERPL-SCNC: 141 MMOL/L (ref 134–144)
TRIGL SERPL-MCNC: 310 MG/DL (ref 0–149)
VLDLC SERPL CALC-MCNC: 55 MG/DL (ref 5–40)
WBC # BLD AUTO: 5.9 X10E3/UL (ref 3.4–10.8)

## 2021-05-06 ENCOUNTER — TELEPHONE (OUTPATIENT)
Dept: FAMILY MEDICINE CLINIC | Facility: CLINIC | Age: 71
End: 2021-05-06

## 2021-05-07 NOTE — PROGRESS NOTES
Labs show mildly elevated cholesterol.  Triglycerides are little elevated.  Work on decreasing sugary and starchy foods.  Her kidney numbers are mildly decreased.  At this point in time there would be no further treatment other than avoiding ibuprofen.  This is probably from her longstanding history of hypertension and just needs to be monitored.

## 2021-05-21 ENCOUNTER — OFFICE VISIT (OUTPATIENT)
Dept: FAMILY MEDICINE CLINIC | Facility: CLINIC | Age: 71
End: 2021-05-21

## 2021-05-21 VITALS
TEMPERATURE: 97.4 F | SYSTOLIC BLOOD PRESSURE: 122 MMHG | OXYGEN SATURATION: 98 % | BODY MASS INDEX: 27.64 KG/M2 | WEIGHT: 156 LBS | HEART RATE: 69 BPM | HEIGHT: 63 IN | DIASTOLIC BLOOD PRESSURE: 80 MMHG

## 2021-05-21 DIAGNOSIS — R73.9 HYPERGLYCEMIA: ICD-10-CM

## 2021-05-21 DIAGNOSIS — R20.2 PARESTHESIAS: Primary | ICD-10-CM

## 2021-05-21 DIAGNOSIS — R20.0 NUMBNESS OF LEFT FOOT: ICD-10-CM

## 2021-05-21 PROCEDURE — 83036 HEMOGLOBIN GLYCOSYLATED A1C: CPT | Performed by: NURSE PRACTITIONER

## 2021-05-21 PROCEDURE — 99213 OFFICE O/P EST LOW 20 MIN: CPT | Performed by: NURSE PRACTITIONER

## 2021-05-22 LAB
FOLATE SERPL-MCNC: 4.3 NG/ML
VIT B12 SERPL-MCNC: 282 PG/ML (ref 232–1245)

## 2021-05-24 ENCOUNTER — TELEPHONE (OUTPATIENT)
Dept: FAMILY MEDICINE CLINIC | Facility: CLINIC | Age: 71
End: 2021-05-24

## 2021-05-24 RX ORDER — LANOLIN ALCOHOL/MO/W.PET/CERES
1000 CREAM (GRAM) TOPICAL DAILY
Qty: 90 TABLET | Refills: 3 | Status: SHIPPED | OUTPATIENT
Start: 2021-05-24 | End: 2022-05-18

## 2021-05-24 NOTE — PROGRESS NOTES
B12 is on lower side of normal.  I do not think this is low enough to cause her symptoms however I would like for her to start taking at 1000 mcg of oral B12 daily.

## 2021-05-24 NOTE — TELEPHONE ENCOUNTER
MAYNOR WITH [facility] CALLED STATED THAT THEY HAVE RECEIVED OUT REQUEST FOR BASIC MEDICAL RECORDS, AND THEY WOULD LIKE TO KNOW EXACTLY WHAT WE INFORMATION WE ARE ASKING FOR.    PLEASE ADVISE.  CALL BACK:[phone]  EXT:[id number]

## 2021-05-25 ENCOUNTER — TELEPHONE (OUTPATIENT)
Dept: FAMILY MEDICINE CLINIC | Facility: CLINIC | Age: 71
End: 2021-05-25

## 2021-05-25 LAB — HBA1C MFR BLD: 5.3 %

## 2021-05-25 NOTE — TELEPHONE ENCOUNTER
Caller: Federica Lay    Relationship: Self    Best call back number: [phone]      What test was performed: X-RAY    When was the test performed: 05/24/2021    Where was the test performed: [facility]    Additional notes:  THE PATIENT STATED SHE HAD X-RAYS DONE OF HER LEFT LEG AND FOOT ON 05/24/2021. SHE WOULD LIKE A CALL BACK ONCE THE RESULTS HAVE BEEN SENT TO HER PCP TO DISCUSS THEM.

## 2021-05-26 NOTE — TELEPHONE ENCOUNTER
Caller: Alireza Federica JOSELUIS    Relationship: Self    Best call back number: [phone]    Caller requesting test results: PATIENT    What test was performed: XRAY    When was the test performed: 5/24/21    Where was the test performed: [facility]     Additional notes: PATIENT CALLING TO SEE IF HER RESULTS HAVE COME BACK ON HER XRAY OF HER LEFT LEG AND FOOT. PATIENT STATED SHE WAS TOLD BY THE XRAY TECHNICIAN THAT PCP WOULD HAVE THE RESULTS IN 24 HOURS. PATIENT STATES SHE IS STILL EXPERIENCING NUMBNESS IN HER LEFT FOOT. PATIENT STATES THE NUMBNESS DISRUPTED HER SLEEP. PATIENT HAS BEEN EXPERIENCING NUMBNESS FOR TWO WEEKS IN HER FOOT.

## 2021-07-14 PROBLEM — M85.89 OSTEOPENIA OF MULTIPLE SITES: Status: ACTIVE | Noted: 2021-07-14

## 2021-07-22 ENCOUNTER — OFFICE VISIT (OUTPATIENT)
Dept: FAMILY MEDICINE CLINIC | Facility: CLINIC | Age: 71
End: 2021-07-22

## 2021-07-22 VITALS
HEART RATE: 74 BPM | OXYGEN SATURATION: 98 % | BODY MASS INDEX: 27.89 KG/M2 | DIASTOLIC BLOOD PRESSURE: 80 MMHG | HEIGHT: 63 IN | TEMPERATURE: 97.6 F | SYSTOLIC BLOOD PRESSURE: 120 MMHG | WEIGHT: 157.38 LBS

## 2021-07-22 DIAGNOSIS — M77.32 HEEL SPUR, LEFT: ICD-10-CM

## 2021-07-22 DIAGNOSIS — L81.9 MULTIPLE HYPOPIGMENTED SKIN LESIONS ON BOTH FOREARMS: ICD-10-CM

## 2021-07-22 DIAGNOSIS — I10 ESSENTIAL HYPERTENSION: ICD-10-CM

## 2021-07-22 DIAGNOSIS — L98.9 SKIN LESIONS: ICD-10-CM

## 2021-07-22 PROCEDURE — 99214 OFFICE O/P EST MOD 30 MIN: CPT | Performed by: NURSE PRACTITIONER

## 2021-07-22 RX ORDER — ATENOLOL 25 MG/1
25 TABLET ORAL DAILY
Qty: 90 TABLET | Refills: 0 | Status: SHIPPED | OUTPATIENT
Start: 2021-07-22 | End: 2021-11-01

## 2021-07-22 RX ORDER — LISINOPRIL 40 MG/1
40 TABLET ORAL DAILY
Qty: 90 TABLET | Refills: 0 | Status: SHIPPED | OUTPATIENT
Start: 2021-07-22 | End: 2021-11-01

## 2021-07-22 NOTE — PROGRESS NOTES
Subjective   Federica Lay is a 70 y.o. female.     Patient is here today for complaints of pain in left heel for the past several weeks.  At times she was having pain shoot from here heel up the back of her foot and leg, to just above her ankle.  The pain became so bad last week, she went to [facility] emergency department and was diagnosed with a very large heel spur.  They told her to apply ice to the area several times a day which has been helping.  She has not tried anything else except Tylenol.  She would like to see if there is something else she can have help with the pain and if she could have a referral to a podiatrist.    Patient also has some concerns about some raised skin lesions as well as some hypopigmented lesions that she has noticed on her skin for the past couple months.  She is concerned the raised lesions could be cancer and the hypopigmented lesions could be a fungus because one of her friends had some white spots on her skin and was diagnosed with a fungal infection.    She would like a refill on her blood pressure medication today.      The following portions of the patient's history were reviewed and updated as appropriate: allergies, current medications, past family history, past medical history, past social history, past surgical history and problem list.    Review of Systems   Constitutional: Negative.    HENT: Negative.    Eyes: Negative.    Respiratory: Negative.    Cardiovascular: Negative.    Gastrointestinal: Negative.    Genitourinary: Negative.    Musculoskeletal: Positive for arthralgias (left heel pain).   Skin: Positive for color change.        lesions   Neurological: Negative for dizziness, syncope, weakness and numbness.   Hematological: Negative for adenopathy.   Psychiatric/Behavioral: Negative for confusion and suicidal ideas. The patient is not nervous/anxious.      Vitals:    07/22/21 1642   BP: 120/80   Pulse: 74   Temp: 97.6 °F (36.4 °C)   SpO2: 98%   Weight:  "71.4 kg (157 lb 6 oz)   Height: 160 cm (62.99\")     Objective   Physical Exam  Vitals and nursing note reviewed.   Constitutional:       General: She is not in acute distress.     Appearance: She is well-developed.   HENT:      Head: Normocephalic.      Right Ear: External ear normal.      Left Ear: External ear normal.      Nose: Nose normal.      Mouth/Throat:      Pharynx: No oropharyngeal exudate.   Eyes:      Conjunctiva/sclera: Conjunctivae normal.      Pupils: Pupils are equal, round, and reactive to light.   Neck:      Thyroid: No thyromegaly.      Trachea: No tracheal deviation.   Cardiovascular:      Rate and Rhythm: Normal rate and regular rhythm.      Heart sounds: Normal heart sounds. No murmur heard.     Pulmonary:      Effort: Pulmonary effort is normal. No respiratory distress.      Breath sounds: Normal breath sounds. No wheezing or rales.   Chest:      Chest wall: No tenderness.   Abdominal:      General: Bowel sounds are normal. There is no distension.      Palpations: Abdomen is soft. There is no splenomegaly or mass.      Tenderness: There is no abdominal tenderness. There is no guarding or rebound.      Hernia: No hernia is present.   Musculoskeletal:         General: No tenderness. Normal range of motion.      Cervical back: Normal range of motion and neck supple.        Feet:    Lymphadenopathy:      Cervical: No cervical adenopathy.      Right cervical: No superficial, deep or posterior cervical adenopathy.     Left cervical: No superficial, deep or posterior cervical adenopathy.   Skin:     General: Skin is warm and dry.      Findings: No rash.      Comments: Several papular lesions noted to bilateral upper extremities.    Multiple hypopigmented macular lesions noted to bilateral upper and lower extremities.   Neurological:      Mental Status: She is alert and oriented to person, place, and time.      Coordination: Coordination normal.      Gait: Gait normal.   Psychiatric:         " Behavior: Behavior normal.         Thought Content: Thought content normal.         Judgment: Judgment normal.         Assessment/Plan   Diagnoses and all orders for this visit:    1. Heel spur, left  -     Ambulatory Referral to Podiatry    2. Multiple hypopigmented skin lesions on both forearms and LE  -     Ambulatory Referral to Dermatology    3. Skin lesions  -     Ambulatory Referral to Dermatology    4. Essential hypertension    Other orders  -     atenolol (TENORMIN) 25 MG tablet; Take 1 tablet by mouth Daily.  Dispense: 90 tablet; Refill: 0  -     lisinopril (PRINIVIL,ZESTRIL) 40 MG tablet; Take 1 tablet by mouth Daily.  Dispense: 90 tablet; Refill: 0       Heel spur  -Educated to continue ice as she has previously been doing since this is helping.  Advised patient to apply diclofenac gel 4 g to heel 4 times daily.  She wishes to buy this over the counter rather than have a prescription for it.  Did advise her to contact the office if this is not effective and could possibly look into oral NSAIDs but her blood pressure would have to be monitored closely with taking these routinely.    Referral placed today for dermatology for further evaluation and treatment of her abnormal skin lesions.    Medications refilled today for treatment of her hypertension.    Patient was encouraged to keep me informed of any acute changes, lack of improvement, or any new concerning symptoms.Patient voiced understanding of all instructions and denied further questions.    Patient to return to clinic for regular follow-up visit, and as needed.

## 2021-08-16 ENCOUNTER — TELEPHONE (OUTPATIENT)
Dept: FAMILY MEDICINE CLINIC | Facility: CLINIC | Age: 71
End: 2021-08-16

## 2021-08-16 NOTE — TELEPHONE ENCOUNTER
Caller: Federica Lay    Relationship to patient: Self    Best call back number: [phone]    Chief complaint: EARACHE AND CHEST PAIN     Patient directed to call 911 or go to their nearest emergency room.     Patient verbalized understanding: [x] Yes  [] No  If no, why?    Additional notes: PATIENT CALLED IN WANTING TO BE SEEN FOR AN EARACHE AND CHEST TIGHTNESS. PATIENT WAS INFORMED THAT THE CHEST TIGHTNESS WAS A RED FLAG FOR US AND THAT WE RECCOMEND THAT SHE BE SEEN BY THE ER OR CALL 911 TO BE ON THE SAFE SIDE. PATIENT EXPRESSED UNDERSTANDING AND DISCONNECTED THE CALL.

## 2021-08-17 ENCOUNTER — OFFICE VISIT (OUTPATIENT)
Dept: FAMILY MEDICINE CLINIC | Facility: CLINIC | Age: 71
End: 2021-08-17

## 2021-08-17 VITALS
DIASTOLIC BLOOD PRESSURE: 70 MMHG | OXYGEN SATURATION: 96 % | SYSTOLIC BLOOD PRESSURE: 130 MMHG | RESPIRATION RATE: 20 BRPM | HEART RATE: 60 BPM | HEIGHT: 63 IN | BODY MASS INDEX: 28.17 KG/M2 | WEIGHT: 159 LBS

## 2021-08-17 DIAGNOSIS — H65.192 OTHER NON-RECURRENT ACUTE NONSUPPURATIVE OTITIS MEDIA OF LEFT EAR: ICD-10-CM

## 2021-08-17 DIAGNOSIS — H60.501 ACUTE OTITIS EXTERNA OF RIGHT EAR, UNSPECIFIED TYPE: Primary | ICD-10-CM

## 2021-08-17 PROCEDURE — 99213 OFFICE O/P EST LOW 20 MIN: CPT | Performed by: NURSE PRACTITIONER

## 2021-08-17 RX ORDER — CLINDAMYCIN HYDROCHLORIDE 300 MG/1
CAPSULE ORAL
COMMUNITY
Start: 2021-08-10 | End: 2021-08-17 | Stop reason: ALTCHOICE

## 2021-08-17 RX ORDER — CEFDINIR 300 MG/1
300 CAPSULE ORAL 2 TIMES DAILY
Qty: 14 CAPSULE | Refills: 0 | Status: SHIPPED | OUTPATIENT
Start: 2021-08-17 | End: 2021-08-24

## 2021-08-17 RX ORDER — OFLOXACIN 3 MG/ML
10 SOLUTION AURICULAR (OTIC) DAILY
Qty: 5 ML | Refills: 0 | Status: SHIPPED | OUTPATIENT
Start: 2021-08-17 | End: 2021-08-24

## 2021-08-17 NOTE — PROGRESS NOTES
"      Subjective     Chief Complaint:    Chief Complaint   Patient presents with   • Earache     bilateral       History of Present Illness:   Bilateral ear pain worse on right x 2-3 days. Has some sinus mild drainage but otherwise no sore throat, cough, soa. She has been using flonase and it helps a little. She felt like she ha some yellow drainage from that right ear last night.   No fever or chills  She is a little dizzy and has a headache.     Review of Systems  Gen- No fevers, chills  CV- No chest pain, palpitations  Resp- No cough, dyspnea  GI- No N/V/D, abd pain    I have reviewed and/or updated the patient's past medical, surgical, family, social history and problem list as appropriate.     Medications:    Current Outpatient Medications:   •  Acetaminophen (TYLENOL PO), Take 1,000 mg by mouth Every 6 (Six) Hours As Needed., Disp: , Rfl:   •  ASPIRIN 81 PO, Take 81 mg by mouth Daily., Disp: , Rfl:   •  atenolol (TENORMIN) 25 MG tablet, Take 1 tablet by mouth Daily., Disp: 90 tablet, Rfl: 0  •  lisinopril (PRINIVIL,ZESTRIL) 40 MG tablet, Take 1 tablet by mouth Daily., Disp: 90 tablet, Rfl: 0  •  vitamin B-12 (CYANOCOBALAMIN) 1000 MCG tablet, Take 1 tablet by mouth Daily., Disp: 90 tablet, Rfl: 3  •  cefdinir (OMNICEF) 300 MG capsule, Take 1 capsule by mouth 2 (Two) Times a Day for 7 days., Disp: 14 capsule, Rfl: 0  •  ofloxacin (FLOXIN) 0.3 % otic solution, Administer 10 drops to the right ear Daily for 7 days., Disp: 5 mL, Rfl: 0    Allergies:  Allergies   Allergen Reactions   • Penicillins Itching       Objective     Vital Signs:   Vitals:    08/17/21 1505   BP: 130/70   Pulse: 60   Resp: 20   SpO2: 96%   Weight: 72.1 kg (159 lb)   Height: 160 cm (62.99\")       Physical Exam:    Physical Exam  Constitutional:       Appearance: She is well-developed.   HENT:      Head: Normocephalic and atraumatic.      Right Ear: External ear normal.      Left Ear: Ear canal and external ear normal. Tympanic membrane is " erythematous.      Ears:      Comments: Scaling of right ear canal     Nose: Nose normal.      Mouth/Throat:      Pharynx: Uvula midline.   Eyes:      Pupils: Pupils are equal, round, and reactive to light.   Cardiovascular:      Rate and Rhythm: Normal rate and regular rhythm.      Heart sounds: Normal heart sounds. No murmur heard.   No friction rub. No gallop.    Pulmonary:      Effort: Pulmonary effort is normal.      Breath sounds: Normal breath sounds.   Abdominal:      General: Bowel sounds are normal.      Palpations: Abdomen is soft.      Tenderness: There is no abdominal tenderness.   Musculoskeletal:      Cervical back: Neck supple.   Lymphadenopathy:      Head:      Right side of head: No submental, submandibular, tonsillar, preauricular or posterior auricular adenopathy.      Left side of head: No submental, submandibular, tonsillar, preauricular or posterior auricular adenopathy.      Cervical: No cervical adenopathy.   Skin:     General: Skin is warm and dry.   Neurological:      Mental Status: She is alert and oriented to person, place, and time.   Psychiatric:         Behavior: Behavior normal.         Body mass index is 28.17 kg/m².    Assessment / Plan     Assessment/Plan:   Problem List Items Addressed This Visit     None      Visit Diagnoses     Acute otitis externa of right ear, unspecified type    -  Primary    Relevant Medications    ofloxacin (FLOXIN) 0.3 % otic solution    Other non-recurrent acute nonsuppurative otitis media of left ear        Relevant Medications    cefdinir (OMNICEF) 300 MG capsule        --Omnicef for AOM, ofloxacin for otitis externa    Follow up:  As needed    Electronically signed by ASA Billingsley   08/17/2021 15:46 EDT      Please note that portions of this note may have been completed with a voice recognition program. Efforts were made to edit the dictations, but occasionally words are mistranscribed.

## 2021-11-01 RX ORDER — LISINOPRIL 40 MG/1
TABLET ORAL
Qty: 90 TABLET | Refills: 0 | Status: SHIPPED | OUTPATIENT
Start: 2021-11-01 | End: 2022-01-21

## 2021-11-01 RX ORDER — ATENOLOL 25 MG/1
TABLET ORAL
Qty: 90 TABLET | Refills: 0 | Status: SHIPPED | OUTPATIENT
Start: 2021-11-01 | End: 2022-01-21

## 2021-11-12 ENCOUNTER — OFFICE VISIT (OUTPATIENT)
Dept: FAMILY MEDICINE CLINIC | Facility: CLINIC | Age: 71
End: 2021-11-12

## 2021-11-12 VITALS
HEIGHT: 63 IN | OXYGEN SATURATION: 95 % | TEMPERATURE: 98.2 F | BODY MASS INDEX: 28.6 KG/M2 | HEART RATE: 59 BPM | DIASTOLIC BLOOD PRESSURE: 88 MMHG | SYSTOLIC BLOOD PRESSURE: 140 MMHG | WEIGHT: 161.4 LBS

## 2021-11-12 DIAGNOSIS — R42 DIZZY: ICD-10-CM

## 2021-11-12 DIAGNOSIS — H66.93 BILATERAL OTITIS MEDIA, UNSPECIFIED OTITIS MEDIA TYPE: Primary | ICD-10-CM

## 2021-11-12 PROCEDURE — 99213 OFFICE O/P EST LOW 20 MIN: CPT

## 2021-11-12 RX ORDER — MECLIZINE HYDROCHLORIDE 25 MG/1
25 TABLET ORAL 3 TIMES DAILY PRN
Qty: 30 TABLET | Refills: 0 | Status: SHIPPED | OUTPATIENT
Start: 2021-11-12 | End: 2021-11-30

## 2021-11-12 RX ORDER — DOXYCYCLINE HYCLATE 100 MG/1
100 CAPSULE ORAL 2 TIMES DAILY
Qty: 28 CAPSULE | Refills: 0 | Status: SHIPPED | OUTPATIENT
Start: 2021-11-12 | End: 2021-11-26

## 2021-11-12 NOTE — PROGRESS NOTES
Acute Office Visit      Patient Name: Federica Lay  : 1950   MRN: [id number]     Chief Complaint:    Chief Complaint   Patient presents with   • Earache     ears feel full; hurting; x3 days   • Abdominal Pain     started 2021       History of Present Illness: Federica Lay is a 71 y.o. female who is here today for earache.  Patient states that both of her ears hurt but the right is worse than the left.  She reports that they feel full.  In the past she has taken cefdinir, clindamycin, and a steroid nasal spray to help with her symptoms but has yet to have any complete improvement.  Does note some mild dizziness that has been more present over the past 3 days.  She notes a random episode of abdominal pain that happened out in the lobby.  It has since resolved.    Subjective     Review of System: Review of Systems   HENT: Positive for ear pain. Negative for congestion, postnasal drip, rhinorrhea, sinus pressure, sinus pain and sore throat.    Respiratory: Negative.    Cardiovascular: Negative.    Gastrointestinal: Positive for abdominal pain.   Genitourinary: Negative.    Musculoskeletal: Negative.    Neurological: Positive for dizziness. Negative for syncope and light-headedness.      I have reviewed the ROS documented by my clinical staff, updated appropriately and I agree. ASA Gan    Past Medical History:   Past Medical History:   Diagnosis Date   • Calculus of gallbladder     Asymptomatic gallstone   • Colon polyp     Tubular adenomata   • Colon polyps 2017   • Diverticulitis    • Diverticulitis of large intestine 2016   • Full dentures    • H/O exercise stress test    • Heartburn 2017    Impression: 2016 - Improved.  No Arrington's esophagus.  Impression: 10/06/2014 - History of reflux.  Mild;    • Helicobacter positive gastritis     It appears that the patient had been treated with clarithromycin, Flagyl, and pantoprazole for 14 days and 2014.    • History of esophageal reflux 2016   • Hypertension    • Internal hemorrhoids 2017   • UTI (urinary tract infection)    • Vertigo    • Wears glasses     TO DRIVE       Past Surgical History:   Past Surgical History:   Procedure Laterality Date   • CATARACT EXTRACTION EXTRACAPSULAR W/ INTRAOCULAR LENS IMPLANTATION Bilateral    • COLONOSCOPY     • COLONOSCOPY N/A 2017    Procedure: COLONOSCOPY WITH HOT BIOPSY POLYPECTOMIES, COLD SNARE POLYPECTOMY;  Surgeon: Abraham Stokes MD;  Location: [facility] ENDOSCOPY;  Service:    • COLONOSCOPY N/A 2020    Procedure: COLONOSCOPY;  Surgeon: Jaron Tong MD;  Location: [facility] ENDOSCOPY;  Service: Gastroenterology;  Laterality: N/A;   • ENDOSCOPY     • ENDOSCOPY N/A 2020    Procedure: ESOPHAGOGASTRODUODENOSCOPY W/ BIOPSIES;  Surgeon: Jaron Tong MD;  Location: [facility] ENDOSCOPY;  Service: Gastroenterology;  Laterality: N/A;   • EYE SURGERY     • HYSTERECTOMY     • UPPER GASTROINTESTINAL ENDOSCOPY         Family History:   Family History   Problem Relation Age of Onset   • Cancer Sister    • Stomach cancer Sister    • Colon cancer Brother    • Arthritis Mother    • Hypertension Mother    • Cirrhosis Neg Hx    • Liver disease Neg Hx    • Crohn's disease Neg Hx    • Liver cancer Neg Hx        Social History:   Social History     Socioeconomic History   • Marital status:    Tobacco Use   • Smoking status: Former Smoker     Packs/day: 0.50     Years: 20.00     Pack years: 10.00     Types: Cigarettes     Start date:      Quit date: 2017     Years since quittin.7   • Smokeless tobacco: Never Used   Vaping Use   • Vaping Use: Never used   Substance and Sexual Activity   • Alcohol use: No   • Drug use: No   • Sexual activity: Defer       Medications:     Current Outpatient Medications:   •  Acetaminophen (TYLENOL PO), Take 1,000 mg by mouth Every 6 (Six) Hours As Needed., Disp: , Rfl:   •  ASPIRIN 81 PO, Take 81  "mg by mouth Daily., Disp: , Rfl:   •  atenolol (TENORMIN) 25 MG tablet, TAKE 1 TABLET DAILY, Disp: 90 tablet, Rfl: 0  •  lisinopril (PRINIVIL,ZESTRIL) 40 MG tablet, TAKE 1 TABLET DAILY, Disp: 90 tablet, Rfl: 0  •  vitamin B-12 (CYANOCOBALAMIN) 1000 MCG tablet, Take 1 tablet by mouth Daily., Disp: 90 tablet, Rfl: 3  •  doxycycline (VIBRAMYCIN) 100 MG capsule, Take 1 capsule by mouth 2 (Two) Times a Day for 14 days., Disp: 28 capsule, Rfl: 0  •  meclizine (ANTIVERT) 25 MG tablet, Take 1 tablet by mouth 3 (Three) Times a Day As Needed for Dizziness., Disp: 30 tablet, Rfl: 0    Allergies:   Allergies   Allergen Reactions   • Penicillins Itching       Objective     Physical Exam:   Vital Signs:   Vitals:    11/12/21 0920   BP: 140/88   Pulse: 59   Temp: 98.2 °F (36.8 °C)   SpO2: 95%   Weight: 73.2 kg (161 lb 6.4 oz)   Height: 160 cm (63\")   PainSc:   8   PainLoc: Abdomen     Body mass index is 28.59 kg/m².     Physical Exam  Vitals and nursing note reviewed.   Constitutional:       General: She is not in acute distress.     Appearance: Normal appearance. She is normal weight. She is not ill-appearing.   HENT:      Head: Normocephalic and atraumatic.      Right Ear: Ear canal and external ear normal. A middle ear effusion is present.      Left Ear: Ear canal and external ear normal. A middle ear effusion is present.      Nose: Nose normal.      Mouth/Throat:      Mouth: Mucous membranes are moist.      Pharynx: Oropharynx is clear. Uvula midline.   Eyes:      Extraocular Movements: Extraocular movements intact.      Conjunctiva/sclera: Conjunctivae normal.      Pupils: Pupils are equal, round, and reactive to light.      Funduscopic exam:     Right eye: Red reflex present.         Left eye: Red reflex present.  Neck:      Thyroid: No thyromegaly.      Vascular: No carotid bruit.      Trachea: Trachea normal.   Cardiovascular:      Rate and Rhythm: Normal rate and regular rhythm.      Pulses: Normal pulses.      Heart " sounds: Normal heart sounds, S1 normal and S2 normal.   Pulmonary:      Effort: Pulmonary effort is normal.      Breath sounds: Normal breath sounds and air entry.   Abdominal:      General: Abdomen is flat. Bowel sounds are normal. There is no distension.      Palpations: Abdomen is soft.      Tenderness: There is no abdominal tenderness.   Musculoskeletal:      Cervical back: Neck supple. No tenderness.   Lymphadenopathy:      Head:      Right side of head: No submental, submandibular, tonsillar, preauricular, posterior auricular or occipital adenopathy.      Left side of head: No submental, submandibular, tonsillar, preauricular, posterior auricular or occipital adenopathy.      Cervical: No cervical adenopathy.   Skin:     General: Skin is warm and dry.      Capillary Refill: Capillary refill takes less than 2 seconds.   Neurological:      General: No focal deficit present.      Mental Status: She is alert and oriented to person, place, and time. Mental status is at baseline.      GCS: GCS eye subscore is 4. GCS verbal subscore is 5. GCS motor subscore is 6.      Cranial Nerves: Cranial nerves are intact.      Sensory: Sensation is intact.      Coordination: Romberg sign negative.      Deep Tendon Reflexes: Reflexes are normal and symmetric.   Psychiatric:         Attention and Perception: Attention normal.         Mood and Affect: Mood normal.         Speech: Speech normal.         Behavior: Behavior normal. Behavior is cooperative.         Assessment / Plan      Assessment/Plan:   Diagnoses and all orders for this visit:    1. Bilateral otitis media, unspecified otitis media type (Primary)  -     doxycycline (VIBRAMYCIN) 100 MG capsule; Take 1 capsule by mouth 2 (Two) Times a Day for 14 days.  Dispense: 28 capsule; Refill: 0    2. Dizzy  -     meclizine (ANTIVERT) 25 MG tablet; Take 1 tablet by mouth 3 (Three) Times a Day As Needed for Dizziness.  Dispense: 30 tablet; Refill: 0         1. We will treat for  underlying bilateral otitis media.  Difficult to visualize TMs due to angle of ear canals.  We will also treat for potential dizziness and vertigo.      Follow Up:   Return if symptoms worsen or fail to improve.    I spent approximately 20 minutes providing clinical care for this patient; including review of patient's chart and provider documentation, face to face time spent with patient in examination room (obtaining history, performing physical exam, discussing diagnosis and management options), placing orders, and completing patient documentation.     ASA Gan  [facility] [place] TIFFANY Escobar

## 2021-11-30 ENCOUNTER — OFFICE VISIT (OUTPATIENT)
Dept: FAMILY MEDICINE CLINIC | Facility: CLINIC | Age: 71
End: 2021-11-30

## 2021-11-30 VITALS
WEIGHT: 161.4 LBS | DIASTOLIC BLOOD PRESSURE: 96 MMHG | OXYGEN SATURATION: 96 % | TEMPERATURE: 98.5 F | HEART RATE: 68 BPM | HEIGHT: 63 IN | BODY MASS INDEX: 28.6 KG/M2 | SYSTOLIC BLOOD PRESSURE: 170 MMHG

## 2021-11-30 DIAGNOSIS — R30.0 DYSURIA: Primary | ICD-10-CM

## 2021-11-30 LAB
BILIRUB BLD-MCNC: NEGATIVE MG/DL
CLARITY, POC: CLEAR
COLOR UR: YELLOW
EXPIRATION DATE: NORMAL
GLUCOSE UR STRIP-MCNC: NEGATIVE MG/DL
KETONES UR QL: NEGATIVE
LEUKOCYTE EST, POC: NEGATIVE
Lab: NORMAL
NITRITE UR-MCNC: NEGATIVE MG/ML
PH UR: 6 [PH] (ref 5–8)
PROT UR STRIP-MCNC: NEGATIVE MG/DL
RBC # UR STRIP: NEGATIVE /UL
SP GR UR: 1.01 (ref 1–1.03)
UROBILINOGEN UR QL: NORMAL

## 2021-11-30 PROCEDURE — 81003 URINALYSIS AUTO W/O SCOPE: CPT

## 2021-11-30 PROCEDURE — 99212 OFFICE O/P EST SF 10 MIN: CPT

## 2021-11-30 RX ORDER — PHENAZOPYRIDINE HYDROCHLORIDE 100 MG/1
100 TABLET, FILM COATED ORAL 3 TIMES DAILY PRN
Qty: 6 TABLET | Refills: 0 | Status: SHIPPED | OUTPATIENT
Start: 2021-11-30 | End: 2021-12-02

## 2021-11-30 RX ORDER — LORATADINE 10 MG/1
TABLET ORAL
COMMUNITY
End: 2022-03-01

## 2021-11-30 NOTE — PROGRESS NOTES
Acute Office Visit      Patient Name: Federica Lay  : 1950   MRN: [id number]     Chief Complaint:    Chief Complaint   Patient presents with   • Urinary Tract Infection     x2 days       History of Present Illness: Federica Lay is a 71 y.o. female who is here today for a possible urinary tract infection.  She initially noticed lumbar pain that started 2 to 3 days ago.  Yesterday she started experiencing urinary frequency and decreased urination.  She denies dysuria and fever.  She does note some lower abdominal pain.  Denies nausea or vomiting.    Subjective     Review of System: Review of Systems   Constitutional: Negative for chills and fever.   Respiratory: Negative for shortness of breath.    Cardiovascular: Negative for chest pain.   Gastrointestinal: Positive for abdominal pain. Negative for diarrhea, nausea and vomiting.   Genitourinary: Positive for decreased urine volume and frequency. Negative for difficulty urinating, dysuria, flank pain, hematuria and urgency.   Musculoskeletal: Positive for back pain.   Neurological: Negative for headaches.      I have reviewed the ROS documented by my clinical staff, updated appropriately and I agree. ASA Gan    Past Medical History:   Past Medical History:   Diagnosis Date   • Calculus of gallbladder     Asymptomatic gallstone   • Colon polyp     Tubular adenomata   • Colon polyps 2017   • Diverticulitis    • Diverticulitis of large intestine 2016   • Full dentures    • H/O exercise stress test    • Heartburn 2017    Impression: 2016 - Improved.  No Arrington's esophagus.  Impression: 10/06/2014 - History of reflux.  Mild;    • Helicobacter positive gastritis     It appears that the patient had been treated with clarithromycin, Flagyl, and pantoprazole for 14 days and 2014.   • History of esophageal reflux 2016   • Hypertension    • Internal hemorrhoids 2017   • UTI (urinary tract infection)    •  Vertigo    • Wears glasses     TO DRIVE       Past Surgical History:   Past Surgical History:   Procedure Laterality Date   • CATARACT EXTRACTION EXTRACAPSULAR W/ INTRAOCULAR LENS IMPLANTATION Bilateral    • COLONOSCOPY     • COLONOSCOPY N/A 2017    Procedure: COLONOSCOPY WITH HOT BIOPSY POLYPECTOMIES, COLD SNARE POLYPECTOMY;  Surgeon: Abraham Stokes MD;  Location: [facility] ENDOSCOPY;  Service:    • COLONOSCOPY N/A 2020    Procedure: COLONOSCOPY;  Surgeon: Jaron Tong MD;  Location: [facility] ENDOSCOPY;  Service: Gastroenterology;  Laterality: N/A;   • ENDOSCOPY     • ENDOSCOPY N/A 2020    Procedure: ESOPHAGOGASTRODUODENOSCOPY W/ BIOPSIES;  Surgeon: Jaron Tong MD;  Location: [facility] ENDOSCOPY;  Service: Gastroenterology;  Laterality: N/A;   • EYE SURGERY     • HYSTERECTOMY     • UPPER GASTROINTESTINAL ENDOSCOPY         Family History:   Family History   Problem Relation Age of Onset   • Cancer Sister    • Stomach cancer Sister    • Colon cancer Brother    • Arthritis Mother    • Hypertension Mother    • Cirrhosis Neg Hx    • Liver disease Neg Hx    • Crohn's disease Neg Hx    • Liver cancer Neg Hx        Social History:   Social History     Socioeconomic History   • Marital status:    Tobacco Use   • Smoking status: Former Smoker     Packs/day: 0.50     Years: 20.00     Pack years: 10.00     Types: Cigarettes     Start date:      Quit date: 2017     Years since quittin.8   • Smokeless tobacco: Never Used   Vaping Use   • Vaping Use: Never used   Substance and Sexual Activity   • Alcohol use: No   • Drug use: No   • Sexual activity: Defer       Medications:     Current Outpatient Medications:   •  Acetaminophen (TYLENOL PO), Take 1,000 mg by mouth Every 6 (Six) Hours As Needed., Disp: , Rfl:   •  ASPIRIN 81 PO, Take 81 mg by mouth Daily., Disp: , Rfl:   •  atenolol (TENORMIN) 25 MG tablet, TAKE 1 TABLET DAILY, Disp: 90 tablet, Rfl: 0  •  lisinopril  "(PRINIVIL,ZESTRIL) 40 MG tablet, TAKE 1 TABLET DAILY, Disp: 90 tablet, Rfl: 0  •  loratadine (CLARITIN) 10 MG tablet, loratadine 10 mg tablet  TK 1 T PO  QD, Disp: , Rfl:   •  vitamin B-12 (CYANOCOBALAMIN) 1000 MCG tablet, Take 1 tablet by mouth Daily., Disp: 90 tablet, Rfl: 3  •  phenazopyridine (Pyridium) 100 MG tablet, Take 1 tablet by mouth 3 (Three) Times a Day As Needed for Bladder Spasms for up to 2 days., Disp: 6 tablet, Rfl: 0    Allergies:   Allergies   Allergen Reactions   • Penicillins Itching       Objective     Physical Exam:   Vital Signs:   Vitals:    11/30/21 1125   BP: 170/96   Pulse: 68   Temp: 98.5 °F (36.9 °C)   SpO2: 96%   Weight: 73.2 kg (161 lb 6.4 oz)   Height: 160 cm (63\")     Body mass index is 28.59 kg/m².     Physical Exam  Vitals and nursing note reviewed.   Constitutional:       Appearance: Normal appearance. She is normal weight.   HENT:      Head: Normocephalic and atraumatic.   Cardiovascular:      Rate and Rhythm: Normal rate and regular rhythm.      Pulses: Normal pulses.      Heart sounds: Normal heart sounds.   Pulmonary:      Effort: Pulmonary effort is normal.      Breath sounds: Normal breath sounds.   Abdominal:      General: Abdomen is flat. Bowel sounds are normal. There is no distension.      Palpations: Abdomen is soft.      Tenderness: There is abdominal tenderness in the suprapubic area.   Skin:     General: Skin is warm and dry.   Neurological:      General: No focal deficit present.      Mental Status: She is alert and oriented to person, place, and time.   Psychiatric:         Mood and Affect: Mood normal.         Behavior: Behavior normal.         Assessment / Plan      Assessment/Plan:   Diagnoses and all orders for this visit:    1. Dysuria (Primary)  -     POCT urinalysis dipstick, automated  -     phenazopyridine (Pyridium) 100 MG tablet; Take 1 tablet by mouth 3 (Three) Times a Day As Needed for Bladder Spasms for up to 2 days.  Dispense: 6 tablet; Refill: " 0  -     Urine Culture - Urine, Urine, Clean Catch         1. Urinalysis done in office, is normal.  Will send urine for culture.  2. She does have suprapubic tenderness to palpation with some tenderness to the left lower quadrant.  Her lumbar pain is more generalized and is not reproducible.  Advised her to trial Pyridium for the next 2 days while waiting for the urine culture to see if symptoms improve.  If culture returns positive we will notify patient and send in an antibiotic.      Follow Up:   Return if symptoms worsen or fail to improve.    I spent approximately 15 minutes providing clinical care for this patient; including review of patient's chart and provider documentation, face to face time spent with patient in examination room (obtaining history, performing physical exam, discussing diagnosis and management options), placing orders, and completing patient documentation.     ASA Gan  [facility] TIFFANY Escobar

## 2021-12-02 LAB
BACTERIA UR CULT: NO GROWTH
BACTERIA UR CULT: NORMAL

## 2022-01-21 DIAGNOSIS — I10 ESSENTIAL HYPERTENSION: Primary | ICD-10-CM

## 2022-01-21 RX ORDER — ATENOLOL 25 MG/1
TABLET ORAL
Qty: 90 TABLET | Refills: 0 | Status: SHIPPED | OUTPATIENT
Start: 2022-01-21 | End: 2022-04-08

## 2022-01-21 RX ORDER — LISINOPRIL 40 MG/1
TABLET ORAL
Qty: 90 TABLET | Refills: 0 | Status: SHIPPED | OUTPATIENT
Start: 2022-01-21 | End: 2022-04-08

## 2022-02-16 ENCOUNTER — OFFICE VISIT (OUTPATIENT)
Dept: FAMILY MEDICINE CLINIC | Facility: CLINIC | Age: 72
End: 2022-02-16

## 2022-02-16 VITALS
WEIGHT: 160 LBS | OXYGEN SATURATION: 96 % | DIASTOLIC BLOOD PRESSURE: 89 MMHG | HEIGHT: 63 IN | SYSTOLIC BLOOD PRESSURE: 140 MMHG | BODY MASS INDEX: 28.35 KG/M2 | TEMPERATURE: 98.1 F | HEART RATE: 75 BPM

## 2022-02-16 DIAGNOSIS — R20.0 NUMBNESS OF LEFT FOOT: Primary | ICD-10-CM

## 2022-02-16 PROCEDURE — 99213 OFFICE O/P EST LOW 20 MIN: CPT | Performed by: NURSE PRACTITIONER

## 2022-02-16 RX ORDER — PREDNISONE 10 MG/1
TABLET ORAL
Qty: 11 TABLET | Refills: 0 | Status: SHIPPED | OUTPATIENT
Start: 2022-02-16 | End: 2022-05-18

## 2022-02-23 ENCOUNTER — PROCEDURE VISIT (OUTPATIENT)
Dept: ORTHOPEDIC SURGERY | Facility: CLINIC | Age: 72
End: 2022-02-23

## 2022-02-23 DIAGNOSIS — M79.605 LEFT LEG PAIN: ICD-10-CM

## 2022-02-23 DIAGNOSIS — R20.2 PARESTHESIA: ICD-10-CM

## 2022-02-23 DIAGNOSIS — R20.0 NUMBNESS OF LEFT FOOT: ICD-10-CM

## 2022-02-23 DIAGNOSIS — G62.9 NEUROPATHY: Primary | ICD-10-CM

## 2022-02-23 DIAGNOSIS — G60.9 IDIOPATHIC PERIPHERAL NEUROPATHY: ICD-10-CM

## 2022-02-23 PROCEDURE — 95911 NRV CNDJ TEST 9-10 STUDIES: CPT | Performed by: PHYSICAL THERAPIST

## 2022-02-23 PROCEDURE — 95886 MUSC TEST DONE W/N TEST COMP: CPT | Performed by: PHYSICAL THERAPIST

## 2022-02-24 ENCOUNTER — TELEPHONE (OUTPATIENT)
Dept: FAMILY MEDICINE CLINIC | Facility: CLINIC | Age: 72
End: 2022-02-24

## 2022-02-24 NOTE — TELEPHONE ENCOUNTER
Caller: Federica Lay    Relationship: Self    Best call back number: [phone]     Caller requesting test results: YES     What test was performed: NERVE TEST ON LEGS    When was the test performed: 02/24/22    Where was the test performed: [facility] [address] IN [place] -    Additional notes: PATIENT CALLED TO GET HER TEST RESULTS.

## 2022-02-25 DIAGNOSIS — G62.9 NEUROPATHY: Primary | ICD-10-CM

## 2022-03-01 DIAGNOSIS — R93.89 ABNORMAL FINDINGS ON DIAGNOSTIC IMAGING OF OTHER SPECIFIED BODY STRUCTURES: ICD-10-CM

## 2022-03-01 DIAGNOSIS — G62.9 NEUROPATHY: Primary | ICD-10-CM

## 2022-03-01 DIAGNOSIS — G62.9 POLYNEUROPATHY: ICD-10-CM

## 2022-04-07 DIAGNOSIS — I10 ESSENTIAL HYPERTENSION: ICD-10-CM

## 2022-04-08 RX ORDER — ATENOLOL 25 MG/1
TABLET ORAL
Qty: 90 TABLET | Refills: 0 | Status: SHIPPED | OUTPATIENT
Start: 2022-04-08 | End: 2022-07-05

## 2022-04-08 RX ORDER — LISINOPRIL 40 MG/1
TABLET ORAL
Qty: 90 TABLET | Refills: 0 | Status: SHIPPED | OUTPATIENT
Start: 2022-04-08 | End: 2022-07-05

## 2022-04-13 ENCOUNTER — TELEPHONE (OUTPATIENT)
Dept: FAMILY MEDICINE CLINIC | Facility: CLINIC | Age: 72
End: 2022-04-13

## 2022-04-13 RX ORDER — METHOCARBAMOL 500 MG/1
500 TABLET, FILM COATED ORAL 3 TIMES DAILY PRN
Qty: 30 TABLET | Refills: 0 | Status: SHIPPED | OUTPATIENT
Start: 2022-04-13

## 2022-04-13 NOTE — TELEPHONE ENCOUNTER
PATIENT IS CALLING BACK AND WOULD LIKE A PRESCRIPTION CALLED FOR THE MUSCLE RELAXER METROCARBAMOL 500MG     [id number] - [place], [place] - [address] AT [address] - [phone]  - [phone] FX  [phone]

## 2022-04-13 NOTE — TELEPHONE ENCOUNTER
Caller: Federica Lay    Relationship: Self    Best call back number: [phone]    SPASMS ON SIDE DOWN LEGS AND BACK. SHE IS IN A LOT OF PAIN AND WOULD LIKE TO KNOW IF IT WOULD BE OK TO TAKE A MUSCLE RELAXER?    SHE HAS SOME METROCARBAMOL 500MG FROM HER ER VISIT A COUPLE OF WEEKS AGO.    PLEASE CALL AND ADVISE

## 2022-04-13 NOTE — TELEPHONE ENCOUNTER
PATIENT HAS BEEN NOTIFIED.    PATIENT STATES THAT SHE ONLY HAS ONE PILL ON HAND AND WOULD LIKE TO KNOW IF YOU COULD SEND A SCRIPT INTO [facility] FOR HER.

## 2022-04-21 ENCOUNTER — TELEPHONE (OUTPATIENT)
Dept: FAMILY MEDICINE CLINIC | Facility: CLINIC | Age: 72
End: 2022-04-21

## 2022-04-21 NOTE — TELEPHONE ENCOUNTER
Caller: Federica Lay    Relationship: Self    Best call back number: [phone]     What is the best time to reach you: ANYTIME    Who are you requesting to speak with (clinical staff, provider,  specific staff member): CHANEL HERNANDEZ    Do you know the name of the person who called: FEDERICA LAY    What was the call regarding: PATIENT STATED THAT SHE HAS BEEN NAUSEATED FOR OVER A WEEK    Do you require a callback: YES

## 2022-05-18 ENCOUNTER — TELEPHONE (OUTPATIENT)
Dept: FAMILY MEDICINE CLINIC | Facility: CLINIC | Age: 72
End: 2022-05-18

## 2022-05-18 ENCOUNTER — HOSPITAL ENCOUNTER (OUTPATIENT)
Dept: GENERAL RADIOLOGY | Facility: HOSPITAL | Age: 72
Discharge: HOME | End: 2022-05-18
Admitting: NURSE PRACTITIONER

## 2022-05-18 ENCOUNTER — OFFICE VISIT (OUTPATIENT)
Dept: NEUROLOGY | Facility: CLINIC | Age: 72
End: 2022-05-18

## 2022-05-18 VITALS
SYSTOLIC BLOOD PRESSURE: 130 MMHG | DIASTOLIC BLOOD PRESSURE: 80 MMHG | OXYGEN SATURATION: 97 % | WEIGHT: 162.8 LBS | HEART RATE: 74 BPM | TEMPERATURE: 97.3 F | BODY MASS INDEX: 28.84 KG/M2 | HEIGHT: 63 IN

## 2022-05-18 DIAGNOSIS — M54.42 LOW BACK PAIN WITH LEFT-SIDED SCIATICA, UNSPECIFIED BACK PAIN LATERALITY, UNSPECIFIED CHRONICITY: Primary | ICD-10-CM

## 2022-05-18 DIAGNOSIS — R20.2 NUMBNESS AND TINGLING OF BOTH LOWER EXTREMITIES: ICD-10-CM

## 2022-05-18 DIAGNOSIS — R20.0 NUMBNESS AND TINGLING OF BOTH LOWER EXTREMITIES: ICD-10-CM

## 2022-05-18 DIAGNOSIS — M54.42 LOW BACK PAIN WITH LEFT-SIDED SCIATICA, UNSPECIFIED BACK PAIN LATERALITY, UNSPECIFIED CHRONICITY: ICD-10-CM

## 2022-05-18 DIAGNOSIS — R93.7 ABNORMAL X-RAY OF LUMBAR SPINE: ICD-10-CM

## 2022-05-18 PROCEDURE — 99213 OFFICE O/P EST LOW 20 MIN: CPT | Performed by: NURSE PRACTITIONER

## 2022-05-18 PROCEDURE — 72114 X-RAY EXAM L-S SPINE BENDING: CPT

## 2022-05-18 NOTE — TELEPHONE ENCOUNTER
I couldn't find a note or office visit linked to lab orders placed in March.  Does patient need to have these before June appointment or can she have them day of?
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
## 2022-05-18 NOTE — TELEPHONE ENCOUNTER
Caller: Federica Lay    Relationship: Self    Best call back number: [phone]    What orders are you requesting (i.e. lab or imaging): LABS    In what timeframe would the patient need to come in: LATE June UNLESS TOLD NEEDED SOONER    Where will you receive your lab/imaging services: IN OFFICE    Additional notes: PLEASE CALL REGARDING LAB ORDERS- DID NOT KNOW WAS TO HAVE LABS DRAWN- ALSO LET HER KNOW WHEN THOSE LABS ARE NEEDED AND IF OK TO WAIT UNTIL June APPOINTMENT.

## 2022-05-23 NOTE — TELEPHONE ENCOUNTER
She does not have appt with me in June. She has appt with neuro. Maybe this needs to be sent to them

## 2022-06-08 NOTE — ANESTHESIA POSTPROCEDURE EVALUATION
Patient: Federica Lay    Procedure Summary     Date:  07/17/20 Room / [facility]    Anesthesia Start:  1246 Anesthesia Stop:  1312    Procedure:  COLONOSCOPY (N/A Anus) Diagnosis:       Colon cancer screening      Family history of colon cancer      (Colon cancer screening [Z12.11])      (Family history of colon cancer [Z80.0])    Surgeon:  Jaron Tong MD Provider:  Siva Delgado CRNA    Anesthesia Type:  MAC ASA Status:  2          Anesthesia Type: MAC    Vitals  Vitals Value Taken Time   /62 7/17/2020  1:50 PM   Temp 97.8 °F (36.6 °C) 7/17/2020  1:50 PM   Pulse 57 7/17/2020  1:50 PM   Resp 17 7/17/2020  1:50 PM   SpO2 94 % 7/17/2020  1:50 PM           Post Anesthesia Care and Evaluation    Patient location during evaluation: bedside  Patient participation: complete - patient participated  Level of consciousness: awake and sleepy but conscious  Pain management: adequate  Airway patency: patent  Anesthetic complications: No anesthetic complications  PONV Status: none  Cardiovascular status: acceptable and hemodynamically stable  Respiratory status: acceptable, room air, nonlabored ventilation and spontaneous ventilation  Hydration status: acceptable       Thank you for visiting the [facility] : [phone]  Today June 8, 2022 you received the    Yellow Fever (YF)    Typhoid - injectable. This vaccine is valid for two years.     Meningitis    Tdap    Follow up vaccine appointments can be made as a NURSE ONLY visit at the Travel Clinic, (BE PREPARED TO WAIT, ) or at designated [place] [facility].    If you are receiving the Rabies vaccines series, it is important that you follow the exact schedule ordered.     Pre-travel     We recommend that you purchase Medical Evacuation Insurance prior to your departure.  Https://wwwnc.cdc.gov/travel/page/insurance    [place] your travel plans with the  Department of [employer] through STEP ( Smart Traveler Enrollment Program ) https://step.state.gov.  STEP is a free service to allow U[place] citizens and nationals traveling and living abroad to enroll their trip with the nearest [place] Embassy or Consulate.    Animal Exposure: Avoid all mammals even if they look healthy.  If there is a bite, scratch or even a lick, wash area immediately with soap and water for 15 minutes and seek medical care within 24 hours for evaluation of Rabies post exposure treatment.  Contact your Medical Evacuation Insurance.    COVID 19 (Sars Cov2) prevention strategies  Physical distancing: Maintain 6 foot (2m) from others.              Avoid large gatherings and public transportation.   Avoid indoor shopping malls, theaters and restaurants   Practice consistent mask wearing covering the nose, mouth and underneath the chin when unable to maintain 6 foot distance from others.  Hand washing: frequent, thorough handwashing with soap and water for 20 seconds (or using a hand  containing 60% alcohol)   Avoid touching face, nose, eyes, mouth unless you have done appropriate hand washing as above.   Clean high touch surfaces with approved disinfectant against Covid 19  (70% Ethanol ) or a bleach solution (add 20 mL (4  teaspoons) of bleach to 1 L (1 quart) of water;)  Be careful not to breath or touch bleach.      Travel Covid 19 Testing:  updated 12/06/2021  International travelers: Pre-travel: diagnostic testing (antigen or PCR) may be required for entry:  See country specific Embassy websites or airline websites.    US ENTRY Requirements: Effective December 6, 2021, all international arrivals to the [place] (regardless of vaccination status or citizenship status) by air are required to have a negative predeparture COVID-19 result from a test taken no more than 1 calendar day prior to departure of the flight to the [place]. Many complex scenarios may result from the 1-day rule. For example, for a flight that arrives in the [place] on a Monday, the test must have been taken no earlier than Sunday local time in the departure city. If the itinerary contains multiple flights, the test can be taken 1 day prior to departure of the first flight or can be taken en route, as long as the connecting airport is not in the [place]. If the test is unable to be taken en route, the traveler will not be able to enter the [place], or if the test is taken en route and is positive, the traveler will have to isolate in that location. If the itinerary contains 1 or more overnight stays en route to the [place], the test must have been taken 1 calendar day before the flight that will enter the [place]; however, if the itinerary has an overnight connection due to limitations in flight availability, retesting will not be required. If the first flight within an itinerary is delayed due to severe weather, aircraft mechanical issues, or other issues outside of the traveler's control, the traveler will only need to be retested if the delay causes the original test to be 24 hours or more past the 1-day window. If a connecting flight is delayed due to any of the above issues, the traveler will only need to be retested if the delay causes the original test to be 48 hours or more past the 1-day  window. If a trip of less than 1 day is made out the [place], a viral test taken in the [place] may be used as a predeparture test as long as the test was taken no more than 1 day prior to rearrival in the [place]; however, if a delay occurs on the return trip and the predeparture test is out of the 1-day window, the traveler will need to be retested before returning to the [place]. Noncitizen nonimmigrants who are unvaccinated remain banned from entry    Post travel: CDC recommends getting tested 3-5 days after your trip AND stay home and self-quarantine for 7 days.      COVID-19 testing scheduling number for pre-travel through [facility]  [phone] (Must have an order). Available 24 hours a day.  You can also schedule through My Chart.     Post-travel illness:  Contact your provider or [facility] if you develop a fever, rash, cough, diarrhea or other symptoms for up to 1 year after travel.  Inform your healthcare provider when and where you traveled to.    Please call [facility] with any questions [phone]  Or send your provider a 'My Chart' note.

## 2022-06-27 ENCOUNTER — HOSPITAL ENCOUNTER (OUTPATIENT)
Dept: MRI IMAGING | Facility: HOSPITAL | Age: 72
End: 2022-06-27

## 2022-07-05 DIAGNOSIS — I10 ESSENTIAL HYPERTENSION: ICD-10-CM

## 2022-07-05 RX ORDER — LISINOPRIL 40 MG/1
TABLET ORAL
Qty: 90 TABLET | Refills: 0 | Status: SHIPPED | OUTPATIENT
Start: 2022-07-05 | End: 2022-09-26

## 2022-07-05 RX ORDER — ATENOLOL 25 MG/1
TABLET ORAL
Qty: 90 TABLET | Refills: 0 | Status: SHIPPED | OUTPATIENT
Start: 2022-07-05 | End: 2022-09-26

## 2022-08-09 ENCOUNTER — OFFICE VISIT (OUTPATIENT)
Dept: FAMILY MEDICINE CLINIC | Facility: CLINIC | Age: 72
End: 2022-08-09

## 2022-08-09 VITALS
HEIGHT: 63 IN | HEART RATE: 97 BPM | SYSTOLIC BLOOD PRESSURE: 180 MMHG | OXYGEN SATURATION: 96 % | WEIGHT: 161.8 LBS | DIASTOLIC BLOOD PRESSURE: 110 MMHG | TEMPERATURE: 97.6 F | BODY MASS INDEX: 28.67 KG/M2

## 2022-08-09 DIAGNOSIS — B37.0 THRUSH: ICD-10-CM

## 2022-08-09 DIAGNOSIS — I10 ESSENTIAL HYPERTENSION: ICD-10-CM

## 2022-08-09 DIAGNOSIS — J02.9 SORE THROAT: Primary | ICD-10-CM

## 2022-08-09 LAB
EXPIRATION DATE: NORMAL
INTERNAL CONTROL: NORMAL
Lab: NORMAL
S PYO AG THROAT QL: NEGATIVE

## 2022-08-09 PROCEDURE — 99213 OFFICE O/P EST LOW 20 MIN: CPT | Performed by: NURSE PRACTITIONER

## 2022-08-09 PROCEDURE — 87880 STREP A ASSAY W/OPTIC: CPT | Performed by: NURSE PRACTITIONER

## 2022-08-09 RX ORDER — AMLODIPINE BESYLATE 10 MG/1
10 TABLET ORAL DAILY
Qty: 90 TABLET | Refills: 0 | Status: SHIPPED | OUTPATIENT
Start: 2022-08-09 | End: 2022-09-26

## 2022-08-09 NOTE — PROGRESS NOTES
"      Subjective     Chief Complaint:    Chief Complaint   Patient presents with   • Sore Throat     Pt sts R side of neck is sore to touch x 3 dys       History of Present Illness:   Sore throat x 3. No drainage. No fever. Side of neck feels sore around lymph note  Tylenol has not helped   Blood pressure elevated.  She is restarted Norvasc.  Needs refill    Review of Systems  Gen- No fevers, chills  CV- No chest pain, palpitations  Resp- No cough, dyspnea  GI- No N/V/D, abd pain  Neuro-No dizziness, headaches      I have reviewed and/or updated the patient's past medical, surgical, family, social history and problem list as appropriate.     Medications:    Current Outpatient Medications:   •  Acetaminophen (TYLENOL PO), Take 1,000 mg by mouth Every 6 (Six) Hours As Needed., Disp: , Rfl:   •  amLODIPine (NORVASC) 10 MG tablet, Take 1 tablet by mouth Daily., Disp: 90 tablet, Rfl: 0  •  ASPIRIN 81 PO, Take 81 mg by mouth Daily., Disp: , Rfl:   •  atenolol (TENORMIN) 25 MG tablet, TAKE 1 TABLET DAILY, Disp: 90 tablet, Rfl: 0  •  lisinopril (PRINIVIL,ZESTRIL) 40 MG tablet, TAKE 1 TABLET DAILY, Disp: 90 tablet, Rfl: 0  •  methocarbamol (Robaxin) 500 MG tablet, Take 1 tablet by mouth 3 (Three) Times a Day As Needed for Muscle Spasms., Disp: 30 tablet, Rfl: 0  •  nystatin (MYCOSTATIN) 100,000 unit/mL suspension, Swish and spit 5 mL 4 (Four) Times a Day for 7 days., Disp: 140 mL, Rfl: 0    Allergies:  Allergies   Allergen Reactions   • Penicillins Itching       Objective     Vital Signs:   Vitals:    08/09/22 1642   BP: (!) 180/110   Pulse: 97   Temp: 97.6 °F (36.4 °C)   SpO2: 96%   Weight: 73.4 kg (161 lb 12.8 oz)   Height: 160 cm (62.99\")   PainSc:   6     Body mass index is 28.67 kg/m².    Physical Exam:    Physical Exam  Constitutional:       Appearance: She is well-developed.   HENT:      Head: Normocephalic and atraumatic.      Right Ear: Tympanic membrane, ear canal and external ear normal.      Left Ear: Tympanic " membrane, ear canal and external ear normal.      Nose: Nose normal.      Mouth/Throat:      Pharynx: Uvula midline. Posterior oropharyngeal erythema present.   Eyes:      Pupils: Pupils are equal, round, and reactive to light.   Cardiovascular:      Rate and Rhythm: Normal rate and regular rhythm.      Heart sounds: Normal heart sounds. No murmur heard.    No friction rub. No gallop.   Pulmonary:      Effort: Pulmonary effort is normal.      Breath sounds: Normal breath sounds.   Abdominal:      General: Bowel sounds are normal.      Palpations: Abdomen is soft.      Tenderness: There is no abdominal tenderness.   Musculoskeletal:      Cervical back: Neck supple.   Lymphadenopathy:      Head:      Right side of head: No submental, submandibular, tonsillar, preauricular or posterior auricular adenopathy.      Left side of head: No submental, submandibular, tonsillar, preauricular or posterior auricular adenopathy.      Cervical: No cervical adenopathy.   Skin:     General: Skin is warm and dry.   Neurological:      Mental Status: She is alert and oriented to person, place, and time.   Psychiatric:         Behavior: Behavior normal.         Assessment / Plan     Assessment/Plan:   Problem List Items Addressed This Visit        Cardiac and Vasculature    Essential hypertension    Relevant Medications    lisinopril (PRINIVIL,ZESTRIL) 40 MG tablet    atenolol (TENORMIN) 25 MG tablet    amLODIPine (NORVASC) 10 MG tablet      Other Visit Diagnoses     Sore throat    -  Primary    Relevant Orders    POCT rapid strep A (Completed)    Thrush        Relevant Medications    nystatin (MYCOSTATIN) 100,000 unit/mL suspension        -- Rapid strep test negative.  We will treat for thrush with nystatin.     Follow up:  6 weeks follow-up blood pressure    Electronically signed by ASA Billingsley   08/09/2022 17:02 EDT      Please note that portions of this note may have been completed with a voice recognition program. Efforts  were made to edit the dictations, but occasionally words are mistranscribed.

## 2022-08-10 ENCOUNTER — TELEPHONE (OUTPATIENT)
Dept: FAMILY MEDICINE CLINIC | Facility: CLINIC | Age: 72
End: 2022-08-10

## 2022-08-10 NOTE — TELEPHONE ENCOUNTER
Called patient, no answer & no vm. LM with dtr to have her call us. Hawa would like to schedule patient for a 6 week follow up to check on her blood pressure.

## 2022-09-26 DIAGNOSIS — I10 ESSENTIAL HYPERTENSION: ICD-10-CM

## 2022-09-26 RX ORDER — AMLODIPINE BESYLATE 10 MG/1
TABLET ORAL
Qty: 90 TABLET | Refills: 0 | Status: SHIPPED | OUTPATIENT
Start: 2022-09-26 | End: 2022-12-22 | Stop reason: SDUPTHER

## 2022-09-26 RX ORDER — ATENOLOL 25 MG/1
TABLET ORAL
Qty: 90 TABLET | Refills: 0 | Status: SHIPPED | OUTPATIENT
Start: 2022-09-26 | End: 2022-12-22 | Stop reason: SDUPTHER

## 2022-09-26 RX ORDER — LISINOPRIL 40 MG/1
TABLET ORAL
Qty: 90 TABLET | Refills: 0 | Status: SHIPPED | OUTPATIENT
Start: 2022-09-26 | End: 2022-12-22 | Stop reason: SDUPTHER

## 2022-10-24 ENCOUNTER — TELEPHONE (OUTPATIENT)
Dept: FAMILY MEDICINE CLINIC | Facility: CLINIC | Age: 72
End: 2022-10-24

## 2022-10-24 NOTE — TELEPHONE ENCOUNTER
HUB can share.  NA, No VM to schedule AWV on is Overdue. Please advise patient and schedule.   Thanks.

## 2022-11-10 ENCOUNTER — TELEPHONE (OUTPATIENT)
Dept: FAMILY MEDICINE CLINIC | Facility: CLINIC | Age: 72
End: 2022-11-10

## 2022-11-10 NOTE — TELEPHONE ENCOUNTER
HUB can share.  LVM to schedule AWV that is Overdue. Please advise patient and schedule.   Thanks.

## 2022-12-22 DIAGNOSIS — I10 ESSENTIAL HYPERTENSION: ICD-10-CM

## 2022-12-22 RX ORDER — AMLODIPINE BESYLATE 10 MG/1
10 TABLET ORAL DAILY
Qty: 90 TABLET | Refills: 0 | Status: SHIPPED | OUTPATIENT
Start: 2022-12-22

## 2022-12-22 RX ORDER — LISINOPRIL 40 MG/1
40 TABLET ORAL DAILY
Qty: 90 TABLET | Refills: 0 | Status: SHIPPED | OUTPATIENT
Start: 2022-12-22 | End: 2023-04-03

## 2022-12-22 RX ORDER — ATENOLOL 25 MG/1
25 TABLET ORAL DAILY
Qty: 90 TABLET | Refills: 0 | Status: SHIPPED | OUTPATIENT
Start: 2022-12-22 | End: 2023-04-03

## 2022-12-22 NOTE — TELEPHONE ENCOUNTER
Caller: [facility]    Relationship:     Best call back number: [phone]    Requested Prescriptions:   Requested Prescriptions     Pending Prescriptions Disp Refills   • amLODIPine (NORVASC) 10 MG tablet 90 tablet 0     Sig: Take 1 tablet by mouth Daily.   • atenolol (TENORMIN) 25 MG tablet 90 tablet 0     Sig: Take 1 tablet by mouth Daily.   • lisinopril (PRINIVIL,ZESTRIL) 40 MG tablet 90 tablet 0     Sig: Take 1 tablet by mouth Daily.        Pharmacy where request should be sent: [facility] - [address] [facility] - [address] [facility] AT PORTAL TO REGISTERED [facility] SITES - [phone]  - [phone] FX  [id number] [place], [place] - [facility] [address] - [phone] PH - [phone] FX       Does the patient have less than a 3 day supply:  [x] Yes  [] No    Would you like a call back once the refill request has been completed: [x] Yes [] No    If the office needs to give you a call back, can they leave a voicemail: [x] Yes [] No    Lorri Llamas Rep   12/22/22 10:25 EST

## 2023-01-19 ENCOUNTER — OFFICE VISIT (OUTPATIENT)
Dept: FAMILY MEDICINE CLINIC | Facility: CLINIC | Age: 73
End: 2023-01-19
Payer: MEDICARE

## 2023-01-19 VITALS
SYSTOLIC BLOOD PRESSURE: 140 MMHG | HEIGHT: 63 IN | OXYGEN SATURATION: 93 % | RESPIRATION RATE: 14 BRPM | BODY MASS INDEX: 28.77 KG/M2 | DIASTOLIC BLOOD PRESSURE: 100 MMHG | TEMPERATURE: 98.5 F | HEART RATE: 84 BPM | WEIGHT: 162.4 LBS

## 2023-01-19 DIAGNOSIS — I10 ESSENTIAL HYPERTENSION: ICD-10-CM

## 2023-01-19 DIAGNOSIS — M94.0 COSTOCHONDRITIS: Primary | ICD-10-CM

## 2023-01-19 DIAGNOSIS — R07.89 ACUTE CHEST WALL PAIN: ICD-10-CM

## 2023-01-19 DIAGNOSIS — Z13.220 LIPID SCREENING: ICD-10-CM

## 2023-01-19 DIAGNOSIS — Z87.440 HISTORY OF UTI: ICD-10-CM

## 2023-01-19 DIAGNOSIS — R31.9 HEMATURIA, UNSPECIFIED TYPE: Primary | ICD-10-CM

## 2023-01-19 LAB
BILIRUB BLD-MCNC: NEGATIVE MG/DL
CLARITY, POC: CLEAR
COLOR UR: YELLOW
EXPIRATION DATE: ABNORMAL
GLUCOSE UR STRIP-MCNC: NEGATIVE MG/DL
KETONES UR QL: NEGATIVE
LEUKOCYTE EST, POC: NEGATIVE
Lab: ABNORMAL
NITRITE UR-MCNC: NEGATIVE MG/ML
PH UR: 6 [PH] (ref 5–8)
PROT UR STRIP-MCNC: ABNORMAL MG/DL
RBC # UR STRIP: ABNORMAL /UL
SP GR UR: 1.03 (ref 1–1.03)
UROBILINOGEN UR QL: NORMAL

## 2023-01-19 PROCEDURE — 81003 URINALYSIS AUTO W/O SCOPE: CPT | Performed by: NURSE PRACTITIONER

## 2023-01-19 PROCEDURE — 71046 X-RAY EXAM CHEST 2 VIEWS: CPT | Performed by: NURSE PRACTITIONER

## 2023-01-19 PROCEDURE — 99214 OFFICE O/P EST MOD 30 MIN: CPT | Performed by: NURSE PRACTITIONER

## 2023-01-19 NOTE — PROGRESS NOTES
Subjective   Federica Lay is a 72 y.o. female.     History of Present Illness  Patient here today for complaints of pain in bilateral ribs since waking up today. She did not injure it in any way, did not do any exercising, lifting, different work, etc. She just woke up with pain to bilateral ribs it does not hurt when she coughs or takes deep breath, just hurts when palpated. She has not had this before. She has history of chronic LBP but no other issues. She has had bronchitis in the past but no significant history of pneumonia. Has not been smoker for the past 7 years. Does not have any cough. Does have history of UTI, but symptoms she is having is not like she has had in the past with UTI. No dysuria or frequency.    Would like to have cholesterol checked since for hyperlipidemia screening, since it has been a while.    BP elevated today. She did not take medications before coming to appointment, feels BP more elevated due to pain.      The following portions of the patient's history were reviewed and updated as appropriate: allergies, current medications, past family history, past medical history, past social history, past surgical history and problem list.    Review of Systems   Constitutional: Negative.    Respiratory: Negative for apnea, cough, chest tightness, shortness of breath and wheezing.    Cardiovascular: Negative.    Gastrointestinal: Negative for abdominal pain, blood in stool, constipation, diarrhea, nausea and vomiting.   Genitourinary: Negative for decreased urine volume, difficulty urinating, dysuria, enuresis, flank pain, frequency, hematuria, pelvic pain and urgency.   Musculoskeletal: Positive for arthralgias (bilatral rib pain) and back pain.   Skin: Negative.    Neurological: Negative for dizziness, syncope, weakness, numbness and headaches.   Psychiatric/Behavioral: Negative for confusion.     Vitals:    01/19/23 1558   BP: 140/100   Pulse: 84   Resp: 14   Temp: 98.5 °F (36.9 °C)  "  SpO2: 93%   Weight: 73.7 kg (162 lb 6.4 oz)   Height: 160 cm (62.99\")     Objective   Physical Exam  Vitals and nursing note reviewed.   Constitutional:       General: She is not in acute distress.     Appearance: Normal appearance. She is not ill-appearing.   HENT:      Head: Normocephalic.   Cardiovascular:      Rate and Rhythm: Normal rate and regular rhythm.   Pulmonary:      Effort: Pulmonary effort is normal.      Breath sounds: Normal breath sounds.   Abdominal:      General: Bowel sounds are normal. There is no distension.      Tenderness: There is no abdominal tenderness.   Musculoskeletal:        Arms:       Cervical back: Normal range of motion. No tenderness.   Neurological:      General: No focal deficit present.      Mental Status: She is alert and oriented to person, place, and time.   Psychiatric:         Mood and Affect: Mood normal.         Thought Content: Thought content normal.         Judgment: Judgment normal.         Assessment & Plan   Diagnoses and all orders for this visit:    1. Costochondritis (Primary)  -     XR Chest PA & Lateral  -     Comprehensive metabolic panel  -     CBC w AUTO Differential    2. Acute chest wall pain  -     XR Chest PA & Lateral  -     Comprehensive metabolic panel  -     CBC w AUTO Differential    3. History of UTI  -     POC Urinalysis Dipstick, Automated    4. Essential hypertension    5. Lipid screening  -     Lipid Panel With LDL/HDL Ratio       Costochondritis/chest wall pain  -She does not want steroids, \"cause heart palpitations\". She has Ibuprofen and home and directed to take 4-600 mg 3-4 times per day until resolves, or for 2 weeks. Will get chest xray per request of patient. Will get CBC and CMP. I will contact patient regarding test results and provide instructions regarding any necessary changes in plan of care. She is going to [facility] for CXR.    History UTI  -UA obtained in clinic and did report trace hematuria and protein but no other abnormal " findings. She will RTC to have urine rechecked in 4 weeks or sooner if and urinary symptoms develop.     HTN  -BP elevated today, she did not take medications today and feels related to pain. Does not want medication changes at this time.     Lipid screening ordered and she will RTC fasting for this.     RTC 2 weeks if no improvement, sooner if symptoms worsen.

## 2023-01-20 ENCOUNTER — TELEPHONE (OUTPATIENT)
Dept: FAMILY MEDICINE CLINIC | Facility: CLINIC | Age: 73
End: 2023-01-20

## 2023-01-20 LAB
ALBUMIN SERPL-MCNC: 4.5 G/DL (ref 3.7–4.7)
ALBUMIN/GLOB SERPL: 2 {RATIO} (ref 1.2–2.2)
ALP SERPL-CCNC: 80 IU/L (ref 44–121)
ALT SERPL-CCNC: 8 IU/L (ref 0–32)
AST SERPL-CCNC: 15 IU/L (ref 0–40)
BASOPHILS # BLD AUTO: 0.1 X10E3/UL (ref 0–0.2)
BASOPHILS NFR BLD AUTO: 1 %
BILIRUB SERPL-MCNC: 0.2 MG/DL (ref 0–1.2)
BUN SERPL-MCNC: 11 MG/DL (ref 8–27)
BUN/CREAT SERPL: 12 (ref 12–28)
CALCIUM SERPL-MCNC: 9.5 MG/DL (ref 8.7–10.3)
CHLORIDE SERPL-SCNC: 101 MMOL/L (ref 96–106)
CO2 SERPL-SCNC: 28 MMOL/L (ref 20–29)
CREAT SERPL-MCNC: 0.95 MG/DL (ref 0.57–1)
EGFRCR SERPLBLD CKD-EPI 2021: 63 ML/MIN/1.73
EOSINOPHIL # BLD AUTO: 0.2 X10E3/UL (ref 0–0.4)
EOSINOPHIL NFR BLD AUTO: 2 %
ERYTHROCYTE [DISTWIDTH] IN BLOOD BY AUTOMATED COUNT: 13.1 % (ref 11.7–15.4)
GLOBULIN SER CALC-MCNC: 2.3 G/DL (ref 1.5–4.5)
GLUCOSE SERPL-MCNC: 113 MG/DL (ref 70–99)
HCT VFR BLD AUTO: 40.5 % (ref 34–46.6)
HGB BLD-MCNC: 13.6 G/DL (ref 11.1–15.9)
IMM GRANULOCYTES # BLD AUTO: 0 X10E3/UL (ref 0–0.1)
IMM GRANULOCYTES NFR BLD AUTO: 0 %
LYMPHOCYTES # BLD AUTO: 1.5 X10E3/UL (ref 0.7–3.1)
LYMPHOCYTES NFR BLD AUTO: 22 %
MCH RBC QN AUTO: 32 PG (ref 26.6–33)
MCHC RBC AUTO-ENTMCNC: 33.6 G/DL (ref 31.5–35.7)
MCV RBC AUTO: 95 FL (ref 79–97)
MONOCYTES # BLD AUTO: 0.5 X10E3/UL (ref 0.1–0.9)
MONOCYTES NFR BLD AUTO: 7 %
NEUTROPHILS # BLD AUTO: 4.6 X10E3/UL (ref 1.4–7)
NEUTROPHILS NFR BLD AUTO: 68 %
PLATELET # BLD AUTO: 231 X10E3/UL (ref 150–450)
POTASSIUM SERPL-SCNC: 4.2 MMOL/L (ref 3.5–5.2)
PROT SERPL-MCNC: 6.8 G/DL (ref 6–8.5)
RBC # BLD AUTO: 4.25 X10E6/UL (ref 3.77–5.28)
SODIUM SERPL-SCNC: 143 MMOL/L (ref 134–144)
WBC # BLD AUTO: 6.7 X10E3/UL (ref 3.4–10.8)

## 2023-01-20 NOTE — TELEPHONE ENCOUNTER
Caller: Federica Lay    Relationship: Self    Best call back number: [phone]    Caller requesting test results: THE PATIENT FEDERICA     What test was performed: X-RAY OF BACK     When was the test performed:01/20/2023    Where was the test performed: [facility]     Additional notes: PLEASE CALL THE PATIENT AND ADVISE

## 2023-01-20 NOTE — PROGRESS NOTES
Her labs look good. She was supposed to have CXR at [facility] today, can you check on it please?

## 2023-01-20 NOTE — TELEPHONE ENCOUNTER
Caller: Federica Lay    Relationship: Self    Best call back number: [phone]      What test was performed: X-RAY OF CHEST    When was the test performed: 1/19/23      Additional notes:     PLEASE CALL WITH RESULTS

## 2023-01-20 NOTE — PROGRESS NOTES
Her urine does show a trace amount of blood so she needs to have her urine rechecked when she comes and get her labs checked

## 2023-01-21 LAB
CHOLEST SERPL-MCNC: 172 MG/DL (ref 0–200)
HDLC SERPL-MCNC: 36 MG/DL (ref 40–60)
LDLC SERPL CALC-MCNC: 104 MG/DL (ref 0–100)
LDLC/HDLC SERPL: 2.75 {RATIO}
TRIGL SERPL-MCNC: 185 MG/DL (ref 0–150)
VLDLC SERPL CALC-MCNC: 32 MG/DL (ref 5–40)

## 2023-01-21 NOTE — PROGRESS NOTES
Her cholesterol is a little abnormal but is better than last time it was checked. Nutrition and activity goals reviewed including: mainly water to drink, limit white flour/processed sugar, higher lean protein, high fiber carbs, regular meals, working toward 150 mins cardio per week, resistance training 2x/week. She will want to discuss wit her PCP about starting medication, to see what Hawa wants her to do.

## 2023-01-23 ENCOUNTER — TELEPHONE (OUTPATIENT)
Dept: FAMILY MEDICINE CLINIC | Facility: CLINIC | Age: 73
End: 2023-01-23

## 2023-01-23 NOTE — TELEPHONE ENCOUNTER
Hub staff attempted to follow warm transfer process and was unsuccessful     Caller: Federica Lay    Relationship to patient: Self    Best call back number: [phone]    Patient is needing: PATIENT IS CALLING FRANK BACK.

## 2023-01-23 NOTE — TELEPHONE ENCOUNTER
PATIENT CALLED AGAIN TO FOLLOW UP REGARDING HER RESULTS.  PATIENT UPSET.  PATIENT HUNG UP.    CALL BACK:  [phone] THIS IS A HOME PHONE

## 2023-01-25 NOTE — PROGRESS NOTES
Make sure she was notified of CXR results please. I just received results again and have sent a couple messages about it

## 2023-02-28 ENCOUNTER — OFFICE VISIT (OUTPATIENT)
Dept: FAMILY MEDICINE CLINIC | Facility: CLINIC | Age: 73
End: 2023-02-28
Payer: MEDICARE

## 2023-02-28 VITALS
WEIGHT: 170 LBS | OXYGEN SATURATION: 96 % | TEMPERATURE: 97.5 F | DIASTOLIC BLOOD PRESSURE: 98 MMHG | HEART RATE: 78 BPM | SYSTOLIC BLOOD PRESSURE: 160 MMHG | BODY MASS INDEX: 30.12 KG/M2 | RESPIRATION RATE: 16 BRPM | HEIGHT: 63 IN

## 2023-02-28 DIAGNOSIS — S39.012S STRAIN OF LUMBAR REGION, SEQUELA: ICD-10-CM

## 2023-02-28 DIAGNOSIS — G62.9 NEUROPATHY: Primary | ICD-10-CM

## 2023-02-28 DIAGNOSIS — M54.50 CHRONIC BILATERAL LOW BACK PAIN, UNSPECIFIED WHETHER SCIATICA PRESENT: ICD-10-CM

## 2023-02-28 DIAGNOSIS — G89.29 CHRONIC BILATERAL LOW BACK PAIN, UNSPECIFIED WHETHER SCIATICA PRESENT: ICD-10-CM

## 2023-02-28 PROCEDURE — 99213 OFFICE O/P EST LOW 20 MIN: CPT | Performed by: NURSE PRACTITIONER

## 2023-02-28 PROCEDURE — 3077F SYST BP >= 140 MM HG: CPT | Performed by: NURSE PRACTITIONER

## 2023-02-28 PROCEDURE — 3080F DIAST BP >= 90 MM HG: CPT | Performed by: NURSE PRACTITIONER

## 2023-02-28 RX ORDER — PREDNISONE 10 MG/1
TABLET ORAL
Qty: 15 TABLET | Refills: 0 | Status: SHIPPED | OUTPATIENT
Start: 2023-02-28 | End: 2023-03-07

## 2023-03-07 ENCOUNTER — OFFICE VISIT (OUTPATIENT)
Dept: FAMILY MEDICINE CLINIC | Facility: CLINIC | Age: 73
End: 2023-03-07
Payer: MEDICARE

## 2023-03-07 VITALS
HEART RATE: 71 BPM | TEMPERATURE: 97 F | DIASTOLIC BLOOD PRESSURE: 98 MMHG | WEIGHT: 159.4 LBS | SYSTOLIC BLOOD PRESSURE: 150 MMHG | HEIGHT: 63 IN | BODY MASS INDEX: 28.24 KG/M2 | OXYGEN SATURATION: 96 %

## 2023-03-07 DIAGNOSIS — G62.9 NEUROPATHY: ICD-10-CM

## 2023-03-07 DIAGNOSIS — G89.29 CHRONIC BILATERAL LOW BACK PAIN WITHOUT SCIATICA: ICD-10-CM

## 2023-03-07 DIAGNOSIS — M54.50 CHRONIC BILATERAL LOW BACK PAIN WITHOUT SCIATICA: ICD-10-CM

## 2023-03-07 DIAGNOSIS — M54.16 LUMBAR RADICULOPATHY: Primary | ICD-10-CM

## 2023-03-07 PROCEDURE — 99214 OFFICE O/P EST MOD 30 MIN: CPT | Performed by: NURSE PRACTITIONER

## 2023-03-07 NOTE — PROGRESS NOTES
"      Subjective     Chief Complaint:    Chief Complaint   Patient presents with   • Leg Pain     Pt sts her left leg is going numb all the way to her foot       History of Present Illness:     Federica Lay is a 72 y.o. female who presents in clinic today with c/o numbness and tingling in left foot- radiating up her leg to her knee  Rates severity 8/10  Intermittent low pain   Changing positions does not alleviate pain   Heat did not alleviate pain   Does not report any aggravating factors   Denies balance issues      steroids did not help    Chronic issue  EMG abnormal 2022    Review of Systems  Gen- No fevers, chills  CV- No chest pain, palpitations  Resp- No cough, dyspnea  GI- No N/V/D, abd pain  Neuro-No dizziness, headaches      I have reviewed and/or updated the patient's past medical, surgical, family, social history and problem list as appropriate.     Medications:    Current Outpatient Medications:   •  Acetaminophen (TYLENOL PO), Take 1,000 mg by mouth Every 6 (Six) Hours As Needed., Disp: , Rfl:   •  amLODIPine (NORVASC) 10 MG tablet, Take 1 tablet by mouth Daily., Disp: 90 tablet, Rfl: 0  •  ASPIRIN 81 PO, Take 81 mg by mouth Daily., Disp: , Rfl:   •  atenolol (TENORMIN) 25 MG tablet, Take 1 tablet by mouth Daily., Disp: 90 tablet, Rfl: 0  •  lisinopril (PRINIVIL,ZESTRIL) 40 MG tablet, Take 1 tablet by mouth Daily., Disp: 90 tablet, Rfl: 0  •  methocarbamol (Robaxin) 500 MG tablet, Take 1 tablet by mouth 3 (Three) Times a Day As Needed for Muscle Spasms., Disp: 30 tablet, Rfl: 0    Allergies:  Allergies   Allergen Reactions   • Penicillin G Itching     ITCHING   • Penicillins Itching       Objective     Vital Signs:   Vitals:    03/07/23 1329   BP: 150/98   Pulse: 71   Temp: 97 °F (36.1 °C)   SpO2: 96%   Weight: 72.3 kg (159 lb 6.4 oz)   Height: 160 cm (63\")   PainSc:   5     Body mass index is 28.24 kg/m².    Physical Exam:    Physical Exam  Vitals and nursing note reviewed.   Constitutional:       " Appearance: She is well-developed.   HENT:      Head: Normocephalic and atraumatic.   Eyes:      Pupils: Pupils are equal, round, and reactive to light.   Cardiovascular:      Rate and Rhythm: Normal rate and regular rhythm.      Heart sounds: Normal heart sounds.   Pulmonary:      Effort: Pulmonary effort is normal.      Breath sounds: Normal breath sounds.   Abdominal:      General: Bowel sounds are normal. There is no distension.      Palpations: Abdomen is soft.      Tenderness: There is no abdominal tenderness.   Musculoskeletal:      Cervical back: Neck supple.      Lumbar back: Spasms and tenderness present. Negative right straight leg raise test and negative left straight leg raise test.   Skin:     General: Skin is warm and dry.      Capillary Refill: Capillary refill takes less than 2 seconds.   Neurological:      General: No focal deficit present.      Mental Status: She is alert and oriented to person, place, and time.   Psychiatric:         Mood and Affect: Mood normal.         Behavior: Behavior normal.         Assessment / Plan     Assessment/Plan:   Problem List Items Addressed This Visit    None  Visit Diagnoses     Lumbar radiculopathy    -  Primary    Relevant Orders    Ambulatory Referral to Physical Therapy Evaluate and treat    MRI Lumbar Spine Without Contrast    Chronic bilateral low back pain without sciatica        Relevant Orders    Ambulatory Referral to Physical Therapy Evaluate and treat    MRI Lumbar Spine Without Contrast    Neuropathy        Relevant Orders    MRI Lumbar Spine Without Contrast        --MRI of back   --start PT  -- consider jessica, she declines at this time, conitnue robaxin        Discussed plan of care in detail with pt today; pt verb understanding and agrees.    Follow up:  6 weeks     IHawa, personally performed the services described in this documentation, as scribed by Noy Calix  in my presence, and is both accurate and  complete      Electronically signed by ASA Billingsley   03/07/2023 13:50 EST      Please note that portions of this note were completed with a voice recognition program.

## 2023-04-03 DIAGNOSIS — I10 ESSENTIAL HYPERTENSION: ICD-10-CM

## 2023-04-03 RX ORDER — ATENOLOL 25 MG/1
TABLET ORAL
Qty: 90 TABLET | Refills: 0 | Status: SHIPPED | OUTPATIENT
Start: 2023-04-03

## 2023-04-03 RX ORDER — LISINOPRIL 40 MG/1
TABLET ORAL
Qty: 90 TABLET | Refills: 0 | Status: SHIPPED | OUTPATIENT
Start: 2023-04-03

## 2023-04-04 ENCOUNTER — HOSPITAL ENCOUNTER (OUTPATIENT)
Dept: MRI IMAGING | Facility: HOSPITAL | Age: 73
Discharge: HOME | End: 2023-04-04
Admitting: NURSE PRACTITIONER
Payer: MEDICARE

## 2023-04-04 DIAGNOSIS — M54.50 CHRONIC BILATERAL LOW BACK PAIN WITHOUT SCIATICA: ICD-10-CM

## 2023-04-04 DIAGNOSIS — M54.16 LUMBAR RADICULOPATHY: ICD-10-CM

## 2023-04-04 DIAGNOSIS — G62.9 NEUROPATHY: ICD-10-CM

## 2023-04-04 DIAGNOSIS — G89.29 CHRONIC BILATERAL LOW BACK PAIN WITHOUT SCIATICA: ICD-10-CM

## 2023-04-04 PROCEDURE — 72148 MRI LUMBAR SPINE W/O DYE: CPT

## 2023-04-05 ENCOUNTER — TELEPHONE (OUTPATIENT)
Dept: FAMILY MEDICINE CLINIC | Facility: CLINIC | Age: 73
End: 2023-04-05

## 2023-04-05 NOTE — TELEPHONE ENCOUNTER
Caller: Federica Lay    Relationship: Self    Best call back number: [phone]    Caller requesting test results: SELF    What test was performed: MRI OF BACK    When was the test performed: 4/4/23    Where was the test performed: [facility]    Additional notes: PLEASE CALL WITH RESULTS

## 2023-04-06 NOTE — TELEPHONE ENCOUNTER
Caller: Federica Lay    Relationship: Self    Best call back number: [phone]    What is the best time to reach you: NOW    Who are you requesting to speak with (clinical staff, provider,  specific staff member): CLINICAL    Do you know the name of the person who called: CHECKING STATUS OF MRI RESULTS    What was the call regarding: WS TOLD RESULTS WOULD BE IN 4/5/23. ASKING FOR RESULTS    Do you require a callback: YES, PLEASE

## 2023-04-10 NOTE — TELEPHONE ENCOUNTER
Caller: Federica Lay    Relationship: Self    Best call back number: [phone]    Caller requesting test results: SELF    What test was performed: MRI    When was the test performed: 04/04/23    Where was the test performed: [facility]    Additional notes: PATIENT HAS CALLED SEVERAL TIMES TO FIND OUT RESULTS. SHE IS GETTING UPSET BECAUSE NO ONE WILL CALL HER BACK. PLEASE CALL EVEN IF RESULTS NOT BACK. STATED HER BACK LOCKED UP ON HER TODAY AND SHE WASN'T ABLE TO SIT DOWN.

## 2023-04-11 NOTE — TELEPHONE ENCOUNTER
PATIENT SAYS SHE HAS CALLED SEVERAL TIMES FOR THE MRI RESULTS. SHE IS BAD PAIN AND NEEDS SOMEONE TO CALL HER BACK TODAY PLEASE.

## 2023-04-12 DIAGNOSIS — M54.50 CHRONIC BILATERAL LOW BACK PAIN WITHOUT SCIATICA: ICD-10-CM

## 2023-04-12 DIAGNOSIS — G89.29 CHRONIC BILATERAL LOW BACK PAIN WITHOUT SCIATICA: ICD-10-CM

## 2023-04-12 DIAGNOSIS — R93.7 ABNORMAL MRI, LUMBAR SPINE: ICD-10-CM

## 2023-04-12 DIAGNOSIS — M54.16 LUMBAR RADICULOPATHY: Primary | ICD-10-CM

## 2023-04-12 NOTE — PROGRESS NOTES
MRI shows bulging disc at L4-5. Her leg issues are coming from her back. I recommend physical therapy (ordered on 3/7) and neurosurgery consult.

## 2023-04-23 DIAGNOSIS — I10 ESSENTIAL HYPERTENSION: ICD-10-CM

## 2023-04-24 RX ORDER — AMLODIPINE BESYLATE 10 MG/1
TABLET ORAL
Qty: 90 TABLET | Refills: 0 | Status: SHIPPED | OUTPATIENT
Start: 2023-04-24

## 2023-04-25 ENCOUNTER — OFFICE VISIT (OUTPATIENT)
Dept: NEUROSURGERY | Facility: CLINIC | Age: 73
End: 2023-04-25
Payer: MEDICARE

## 2023-04-25 VITALS — WEIGHT: 159 LBS | BODY MASS INDEX: 28.17 KG/M2 | HEIGHT: 63 IN | TEMPERATURE: 98.2 F

## 2023-04-25 DIAGNOSIS — M54.16 LUMBAR RADICULOPATHY: Primary | ICD-10-CM

## 2023-04-25 DIAGNOSIS — M51.36 DDD (DEGENERATIVE DISC DISEASE), LUMBAR: ICD-10-CM

## 2023-04-25 PROCEDURE — 99203 OFFICE O/P NEW LOW 30 MIN: CPT | Performed by: NEUROLOGICAL SURGERY

## 2023-04-25 PROCEDURE — 1160F RVW MEDS BY RX/DR IN RCRD: CPT | Performed by: NEUROLOGICAL SURGERY

## 2023-04-25 PROCEDURE — 1159F MED LIST DOCD IN RCRD: CPT | Performed by: NEUROLOGICAL SURGERY
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
## 2023-04-28 ENCOUNTER — OFFICE VISIT (OUTPATIENT)
Dept: FAMILY MEDICINE CLINIC | Facility: CLINIC | Age: 73
End: 2023-04-28
Payer: MEDICARE

## 2023-04-28 VITALS
HEART RATE: 64 BPM | SYSTOLIC BLOOD PRESSURE: 140 MMHG | OXYGEN SATURATION: 95 % | HEIGHT: 63 IN | TEMPERATURE: 98 F | BODY MASS INDEX: 28.35 KG/M2 | WEIGHT: 160 LBS | DIASTOLIC BLOOD PRESSURE: 98 MMHG

## 2023-04-28 DIAGNOSIS — Z12.31 ENCOUNTER FOR SCREENING MAMMOGRAM FOR MALIGNANT NEOPLASM OF BREAST: ICD-10-CM

## 2023-04-28 DIAGNOSIS — Z00.00 MEDICARE ANNUAL WELLNESS VISIT, SUBSEQUENT: Primary | ICD-10-CM

## 2023-04-28 DIAGNOSIS — R93.7 ABNORMAL MRI, LUMBAR SPINE: ICD-10-CM

## 2023-04-28 DIAGNOSIS — M54.16 LUMBAR RADICULOPATHY: ICD-10-CM

## 2023-04-28 DIAGNOSIS — M85.80 OSTEOPENIA, UNSPECIFIED LOCATION: ICD-10-CM

## 2023-04-28 DIAGNOSIS — Z78.0 POST-MENOPAUSAL: ICD-10-CM

## 2023-04-28 DIAGNOSIS — I10 ESSENTIAL HYPERTENSION: ICD-10-CM

## 2023-04-28 NOTE — PROGRESS NOTES
The ABCs of the Annual Wellness Visit  Subsequent Medicare Wellness Visit    Subjective    Federica Lay is a 72 y.o. female who presents for a Subsequent Medicare Wellness Visit and preventative visit.    The following portions of the patient's history were reviewed and   updated as appropriate: allergies, current medications, past family history, past medical history, past social history, past surgical history and problem list.    Compared to one year ago, the patient feels her physical   health is the same other than her back.    Compared to one year ago, the patient feels her mental   health is better.    Recent Hospitalizations:  She was not admitted to the hospital during the last year.       Current Medical Providers:  Patient Care Team:  Hawa Hendrickson APRN as PCP - General (Nurse Practitioner)    Outpatient Medications Prior to Visit   Medication Sig Dispense Refill   • Acetaminophen (TYLENOL PO) Take 1,000 mg by mouth Every 6 (Six) Hours As Needed.     • amLODIPine (NORVASC) 10 MG tablet TAKE 1 TABLET DAILY 90 tablet 0   • ASPIRIN 81 PO Take 81 mg by mouth Daily.     • atenolol (TENORMIN) 25 MG tablet TAKE 1 TABLET DAILY 90 tablet 0   • lisinopril (PRINIVIL,ZESTRIL) 40 MG tablet TAKE 1 TABLET DAILY 90 tablet 0   • methocarbamol (Robaxin) 500 MG tablet Take 1 tablet by mouth 3 (Three) Times a Day As Needed for Muscle Spasms. 30 tablet 0     No facility-administered medications prior to visit.       No opioid medication identified on active medication list. I have reviewed chart for other potential  high risk medication/s and harmful drug interactions in the elderly.          Aspirin is on active medication list. Aspirin use is indicated based on review of current medical condition/s. Pros and cons of this therapy have been discussed today. Benefits of this medication outweigh potential harm.  Patient has been encouraged to continue taking this medication.  .      Patient Active Problem List  "  Diagnosis   • Fatty liver   • Essential hypertension   • Chronic left-sided low back pain with left-sided sciatica   • Osteopenia of multiple sites     Advance Care Planning   Advance Care Planning     Advance Directive is not on file.  ACP discussion was held with the patient during this visit. Patient does not have an advance directive, information provided.     Objective    Vitals:    23 0950   BP: 140/98   Pulse: 64   Temp: 98 °F (36.7 °C)   SpO2: 95%   Weight: 72.6 kg (160 lb)   Height: 160 cm (63\")   PainSc:   4     Estimated body mass index is 28.34 kg/m² as calculated from the following:    Height as of this encounter: 160 cm (63\").    Weight as of this encounter: 72.6 kg (160 lb).    BMI is >= 25 and <30. (Overweight) The following options were offered after discussion;: weight loss educational material (shared in after visit summary)      Does the patient have evidence of cognitive impairment? No          HEALTH RISK ASSESSMENT    Smoking Status:  Social History     Tobacco Use   Smoking Status Former   • Packs/day: 0.50   • Years: 20.00   • Pack years: 10.00   • Types: Cigarettes   • Start date:    • Quit date: 2017   • Years since quittin.2   • Passive exposure: Never   Smokeless Tobacco Never     Alcohol Consumption:  Social History     Substance and Sexual Activity   Alcohol Use No     Fall Risk Screen:    PATRICIA Fall Risk Assessment was completed, and patient is at LOW risk for falls.Assessment completed on:2023    Depression Screenin/28/2023     9:48 AM   PHQ-2/PHQ-9 Depression Screening   Little Interest or Pleasure in Doing Things 0-->not at all   Feeling Down, Depressed or Hopeless 0-->not at all   PHQ-9: Brief Depression Severity Measure Score 0       Health Habits and Functional and Cognitive Screenin/28/2023     9:48 AM   Functional & Cognitive Status   Do you have difficulty preparing food and eating? No   Do you have difficulty bathing yourself, " getting dressed or grooming yourself? No   Do you have difficulty using the toilet? No   Do you have difficulty moving around from place to place? No   Do you have trouble with steps or getting out of a bed or a chair? No   Current Diet Unhealthy Diet   Dental Exam Up to date   Eye Exam Up to date   Exercise (times per week) 0 times per week   Current Exercises Include No Regular Exercise   Do you need help using the phone?  No   Are you deaf or do you have serious difficulty hearing?  No   Do you need help with transportation? No   Do you need help shopping? No   Do you need help preparing meals?  No   Do you need help with housework?  No   Do you need help with laundry? No   Do you need help taking your medications? No   Do you need help managing money? No   Do you ever drive or ride in a car without wearing a seat belt? Yes       Age-appropriate Screening Schedule:  Refer to the list below for future screening recommendations based on patient's age, sex and/or medical conditions. Orders for these recommended tests are listed in the plan section. The patient has been provided with a written plan.    Health Maintenance   Topic Date Due   • ZOSTER VACCINE (1 of 2) Never done   • HEPATITIS C SCREENING  Never done   • COVID-19 Vaccine (2 - Booster for Britton series) 06/02/2021   • DXA SCAN  05/04/2023   • MAMMOGRAM  07/13/2023   • INFLUENZA VACCINE  08/01/2023   • ANNUAL WELLNESS VISIT  04/28/2024   • COLORECTAL CANCER SCREENING  07/17/2025   • TDAP/TD VACCINES (2 - Td or Tdap) 04/27/2026   • Pneumococcal Vaccine 65+  Discontinued                  CMS Preventative Services Quick Reference  Risk Factors Identified During Encounter  Chronic Pain: Natural history and expected course discussed. Questions answered.  The above risks/problems have been discussed with the patient.  Pertinent information has been shared with the patient in the After Visit Summary.  An After Visit Summary and PPPS were made available to the  "patient.    Follow Up:   Next Medicare Wellness visit to be scheduled in 1 year.       Additional E&M Note during same encounter follows:  Patient has multiple medical problems which are significant and separately identifiable that require additional work above and beyond the Medicare Wellness Visit.      Chief Complaint  Medicare Wellness-subsequent    Subjective        HPI  Federica Lay is also being seen today  Back pain and left leg numbness, numbness has been coming and going for at least 1.5 years. She has not started PT but plans to next week. She is taking tylenol and motrin. Uses robaxin just as needed not often. MRI shows DDD and moderate stenosis, saw Dr. Shay, note reviewed.   HTN on atenolol, norvasc, lisinopril. She only takes norvasc every few days, not needing everyday.   Follows with Dr. Patel with cardiology   HLD managed with diet  Does not sleep a lot, but has appt with sleep study [facility].   Eye exam not up to date but she plans to schedule.  Due for mammogram and DEXA scan in July.  Does have osteopenia         Objective   Vital Signs:  /98   Pulse 64   Temp 98 °F (36.7 °C)   Ht 160 cm (63\")   Wt 72.6 kg (160 lb)   SpO2 95%   BMI 28.34 kg/m²     Physical Exam  Constitutional:       Appearance: She is well-developed.   HENT:      Head: Normocephalic and atraumatic.      Right Ear: Tympanic membrane, ear canal and external ear normal.      Left Ear: Tympanic membrane, ear canal and external ear normal.      Nose: Nose normal.      Mouth/Throat:      Pharynx: Uvula midline.   Eyes:      Pupils: Pupils are equal, round, and reactive to light.   Neck:      Thyroid: No thyromegaly.      Vascular: No carotid bruit.   Cardiovascular:      Rate and Rhythm: Normal rate and regular rhythm.      Heart sounds: Normal heart sounds. No murmur heard.    No friction rub. No gallop.   Pulmonary:      Effort: Pulmonary effort is normal.      Breath sounds: Normal breath sounds. "   Abdominal:      General: Bowel sounds are normal.      Palpations: Abdomen is soft.      Tenderness: There is no abdominal tenderness.   Musculoskeletal:      Cervical back: Neck supple.   Lymphadenopathy:      Head:      Right side of head: No submental, submandibular, tonsillar, preauricular or posterior auricular adenopathy.      Left side of head: No submental, submandibular, tonsillar, preauricular or posterior auricular adenopathy.      Cervical: No cervical adenopathy.   Skin:     General: Skin is warm and dry.      Capillary Refill: Capillary refill takes less than 2 seconds.   Neurological:      General: No focal deficit present.      Mental Status: She is alert and oriented to person, place, and time.   Psychiatric:         Mood and Affect: Mood normal.         Behavior: Behavior normal.                         Assessment and Plan   Diagnoses and all orders for this visit:    1. Medicare annual wellness visit, subsequent (Primary)    2. Encounter for screening mammogram for malignant neoplasm of breast  -     Mammo Screening Digital Tomosynthesis Bilateral With CAD; Future    3. Post-menopausal  -     DEXA Bone Density Axial; Future    4. Osteopenia, unspecified location  -     DEXA Bone Density Axial; Future    5. Essential hypertension    6. Lumbar radiculopathy    7. Abnormal MRI, lumbar spine      -- wellness visit performed, avoid prolonged fasting periods, ensure good hydration, stay active yearly eye exam  -- continue f/u with NS, robaxin, motrin, and PT  -- BP better, continue current meds and keep f/u with cardiology  -- discuss we can refer to sleep medicine if she wishes, she will let me know           Follow Up   Return in about 6 months (around 10/28/2023).  Patient was given instructions and counseling regarding her condition or for health maintenance advice. Please see specific information pulled into the AVS if appropriate.

## 2023-05-22 ENCOUNTER — HOSPITAL ENCOUNTER (OUTPATIENT)
Dept: GENERAL RADIOLOGY | Facility: HOSPITAL | Age: 73
Discharge: HOME | End: 2023-05-22
Admitting: NEUROLOGICAL SURGERY
Payer: MEDICARE

## 2023-05-22 DIAGNOSIS — M54.16 LUMBAR RADICULOPATHY: ICD-10-CM

## 2023-05-22 PROCEDURE — 72120 X-RAY BEND ONLY L-S SPINE: CPT

## 2023-05-23 ENCOUNTER — OFFICE VISIT (OUTPATIENT)
Dept: NEUROSURGERY | Facility: CLINIC | Age: 73
End: 2023-05-23
Payer: MEDICARE

## 2023-05-23 VITALS — BODY MASS INDEX: 28 KG/M2 | TEMPERATURE: 98.2 F | HEIGHT: 63 IN | WEIGHT: 158 LBS

## 2023-05-23 DIAGNOSIS — M54.9 MECHANICAL BACK PAIN: Primary | ICD-10-CM

## 2023-05-23 DIAGNOSIS — M51.36 DDD (DEGENERATIVE DISC DISEASE), LUMBAR: ICD-10-CM

## 2023-05-23 DIAGNOSIS — M54.16 LUMBAR RADICULOPATHY: ICD-10-CM

## 2023-05-23 PROCEDURE — 1160F RVW MEDS BY RX/DR IN RCRD: CPT | Performed by: NEUROLOGICAL SURGERY

## 2023-05-23 PROCEDURE — 1159F MED LIST DOCD IN RCRD: CPT | Performed by: NEUROLOGICAL SURGERY

## 2023-05-23 PROCEDURE — 99213 OFFICE O/P EST LOW 20 MIN: CPT | Performed by: NEUROLOGICAL SURGERY
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
## 2023-07-20 ENCOUNTER — OFFICE VISIT (OUTPATIENT)
Dept: FAMILY MEDICINE CLINIC | Facility: CLINIC | Age: 73
End: 2023-07-20
Payer: MEDICARE

## 2023-07-20 VITALS
TEMPERATURE: 97.4 F | DIASTOLIC BLOOD PRESSURE: 98 MMHG | SYSTOLIC BLOOD PRESSURE: 178 MMHG | RESPIRATION RATE: 16 BRPM | HEIGHT: 63 IN | BODY MASS INDEX: 28.84 KG/M2 | WEIGHT: 162.8 LBS | HEART RATE: 60 BPM | OXYGEN SATURATION: 93 %

## 2023-07-20 DIAGNOSIS — I10 ESSENTIAL HYPERTENSION: ICD-10-CM

## 2023-07-20 DIAGNOSIS — J18.9 PNEUMONIA OF RIGHT LUNG DUE TO INFECTIOUS ORGANISM, UNSPECIFIED PART OF LUNG: Primary | ICD-10-CM

## 2023-07-20 PROCEDURE — 3080F DIAST BP >= 90 MM HG: CPT | Performed by: NURSE PRACTITIONER

## 2023-07-20 PROCEDURE — 3077F SYST BP >= 140 MM HG: CPT | Performed by: NURSE PRACTITIONER

## 2023-07-20 PROCEDURE — 99214 OFFICE O/P EST MOD 30 MIN: CPT | Performed by: NURSE PRACTITIONER

## 2023-07-20 RX ORDER — PREDNISONE 20 MG/1
20 TABLET ORAL DAILY
Qty: 5 TABLET | Refills: 0 | Status: SHIPPED | OUTPATIENT
Start: 2023-07-20 | End: 2023-07-25

## 2023-07-20 RX ORDER — AZITHROMYCIN 250 MG/1
TABLET, FILM COATED ORAL
Qty: 6 TABLET | Refills: 0 | Status: SHIPPED | OUTPATIENT
Start: 2023-07-20

## 2023-07-20 NOTE — PROGRESS NOTES
"                      Established Patient        Chief Complaint:   Chief Complaint   Patient presents with    Sinusitis    Sore Throat    Wheezing    Nausea     Pt has pneumonia          History of Present Illness:    Federica Lay is a 72 y.o. female who presents today for complaints of sinusitis, sore throat, cough, nausea, recent pneumonia diagnosis.     Diagnosed with RLL pneumonia on 7/6--reports that her breathing has improved some,  has been getting so nauseous that she feels like she is going to vomit. Patient was prescribed doxycycline and has finished antibiotic course.    Patient is nonsmoker    BP is elevated today, reports that she took it at home before leaving and it was 136/82. Reports that she always has a higher BP when here. Denies chest pain, headache, dizziness    Has been monitoring BP and oxygen at home    Subjective     The following portions of the patient's history were reviewed and updated as appropriate: allergies, current medications, past family history, past medical history, past social history, past surgical history and problem list.    ALLERGIES  Allergies   Allergen Reactions    Penicillin G Itching     ITCHING    Penicillins Itching       ROS  Review of Systems   Constitutional:  Negative for fatigue and unexpected weight change.   HENT:  Positive for sinus pressure, sinus pain and sore throat.    Respiratory:  Positive for cough, shortness of breath and wheezing.    Cardiovascular:  Negative for chest pain and palpitations.   Gastrointestinal:  Positive for nausea. Negative for abdominal pain, constipation, diarrhea and vomiting.   Neurological:  Negative for dizziness and weakness.   Psychiatric/Behavioral:  Negative for confusion and sleep disturbance.      Objective     Vital Signs:   /98   Pulse 60   Temp 97.4 °F (36.3 °C)   Resp 16   Ht 160 cm (63\")   Wt 73.8 kg (162 lb 12.8 oz)   LMP  (LMP Unknown)   SpO2 93%   BMI 28.84 kg/m²             Physical Exam "   Physical Exam  Vitals and nursing note reviewed.   HENT:      Nose: Mucosal edema and congestion present.      Right Turbinates: Swollen.      Left Turbinates: Swollen.      Right Sinus: No maxillary sinus tenderness.      Left Sinus: No maxillary sinus tenderness.      Mouth/Throat:      Lips: Pink.      Pharynx: Posterior oropharyngeal erythema present.   Eyes:      General: Lids are normal.      Extraocular Movements: Extraocular movements intact.      Pupils: Pupils are equal, round, and reactive to light.   Cardiovascular:      Rate and Rhythm: Normal rate and regular rhythm.      Heart sounds: Normal heart sounds.   Pulmonary:      Effort: Pulmonary effort is normal.      Breath sounds: Decreased breath sounds present.   Abdominal:      General: Bowel sounds are normal.      Palpations: Abdomen is soft.   Musculoskeletal:         General: Normal range of motion.   Neurological:      Mental Status: She is alert and oriented to person, place, and time.      Gait: Gait is intact.   Psychiatric:         Attention and Perception: Attention normal.         Mood and Affect: Mood and affect normal.         Speech: Speech normal.         Behavior: Behavior normal. Behavior is cooperative.       Assessment and Plan      Assessment/Plan:   Diagnoses and all orders for this visit:    1. Pneumonia of right lung due to infectious organism, unspecified part of lung (Primary)  -     azithromycin (Zithromax Z-Juan Miguel) 250 MG tablet; Take 2 tablets by mouth on day 1, then 1 tablet daily on days 2-5  Dispense: 6 tablet; Refill: 0  -     predniSONE (DELTASONE) 20 MG tablet; Take 1 tablet by mouth Daily for 5 days.  Dispense: 5 tablet; Refill: 0  -     XR Chest PA & Lateral; Future        Discussion Summary:  Discussed plan of care in detail with pt today; pt verb understanding and agrees.      I spent 30 minutes caring for Federica on this date of service. This time includes time spent by me in the following activities:preparing for  the visit, reviewing tests, obtaining and/or reviewing a separately obtained history, performing a medically appropriate examination and/or evaluation , counseling and educating the patient/family/caregiver, ordering medications, tests, or procedures, and documenting information in the medical record      I have reviewed and updated all copied forward information, as appropriate.  I attest to the accuracy and relevance of any unchanged information.      Follow up:  Return if symptoms worsen or fail to improve.     Patient Education:  There are no Patient Instructions on file for this visit.    ASA Shields  07/25/23  09:07 EDT          Please note that portions of this note may have been completed with a voice recognition program.

## 2023-07-22 DIAGNOSIS — I10 ESSENTIAL HYPERTENSION: ICD-10-CM

## 2023-07-24 ENCOUNTER — TELEPHONE (OUTPATIENT)
Dept: FAMILY MEDICINE CLINIC | Facility: CLINIC | Age: 73
End: 2023-07-24

## 2023-07-24 RX ORDER — AMLODIPINE BESYLATE 10 MG/1
TABLET ORAL
Qty: 90 TABLET | Refills: 0 | Status: SHIPPED | OUTPATIENT
Start: 2023-07-24

## 2023-07-24 NOTE — TELEPHONE ENCOUNTER
Caller: Federica Lay    Relationship: Self    Best call back number: [phone]     Caller requesting test results: PATIENT    What test was performed: XRAY OF CHEST    When was the test performed: 07.20.23    Where was the test performed: [facility]    Additional notes: PLEASE CALL PATIENT BACK ASAP TO DISCUSS RESULTS. THANK YOU.

## 2023-07-25 NOTE — TELEPHONE ENCOUNTER
Patient has called office requesting results from chest xray that was completed on 7/21/2023.  Instructed patient that patient will be contacted once pcp office results have been received.  Patient verified understanding.

## 2023-09-13 ENCOUNTER — OFFICE VISIT (OUTPATIENT)
Dept: FAMILY MEDICINE CLINIC | Facility: CLINIC | Age: 73
End: 2023-09-13
Payer: MEDICARE

## 2023-09-13 VITALS
OXYGEN SATURATION: 94 % | WEIGHT: 162 LBS | HEIGHT: 63 IN | HEART RATE: 81 BPM | DIASTOLIC BLOOD PRESSURE: 80 MMHG | BODY MASS INDEX: 28.7 KG/M2 | SYSTOLIC BLOOD PRESSURE: 135 MMHG

## 2023-09-13 DIAGNOSIS — N90.89 LABIAL LESION: Primary | ICD-10-CM

## 2023-09-13 PROCEDURE — 1159F MED LIST DOCD IN RCRD: CPT | Performed by: NURSE PRACTITIONER

## 2023-09-13 PROCEDURE — 3075F SYST BP GE 130 - 139MM HG: CPT | Performed by: NURSE PRACTITIONER

## 2023-09-13 PROCEDURE — 3079F DIAST BP 80-89 MM HG: CPT | Performed by: NURSE PRACTITIONER

## 2023-09-13 PROCEDURE — 1160F RVW MEDS BY RX/DR IN RCRD: CPT | Performed by: NURSE PRACTITIONER

## 2023-09-13 PROCEDURE — 99213 OFFICE O/P EST LOW 20 MIN: CPT | Performed by: NURSE PRACTITIONER

## 2023-09-13 RX ORDER — DOXYCYCLINE HYCLATE 100 MG/1
100 CAPSULE ORAL 2 TIMES DAILY
Qty: 10 CAPSULE | Refills: 0 | Status: SHIPPED | OUTPATIENT
Start: 2023-09-13 | End: 2023-09-18

## 2023-09-13 NOTE — PROGRESS NOTES
"      Subjective     Chief Complaint:    Chief Complaint   Patient presents with    Vaginal Injury     Pt has mass on labia       History of Present Illness:   Labia lesion x 1 month, now more painful over the past few days, no fever, did squeeze the lesion    Review of Systems  Gen- No fevers, chills  CV- No chest pain, palpitations  Resp- No cough, dyspnea  GI- No N/V/D, abd pain  Neuro-No dizziness, headaches      I have reviewed and/or updated the patient's past medical, surgical, family, social history and problem list as appropriate.     Medications:    Current Outpatient Medications:     Acetaminophen (TYLENOL PO), Take 1,000 mg by mouth Every 6 (Six) Hours As Needed., Disp: , Rfl:     amLODIPine (NORVASC) 10 MG tablet, TAKE 1 TABLET DAILY, Disp: 90 tablet, Rfl: 0    ASPIRIN 81 PO, Take 81 mg by mouth Daily., Disp: , Rfl:     atenolol (TENORMIN) 25 MG tablet, TAKE 1 TABLET DAILY, Disp: 90 tablet, Rfl: 0    lisinopril (PRINIVIL,ZESTRIL) 40 MG tablet, TAKE 1 TABLET DAILY, Disp: 90 tablet, Rfl: 0    doxycycline (VIBRAMYCIN) 100 MG capsule, Take 1 capsule by mouth 2 (Two) Times a Day for 5 days., Disp: 10 capsule, Rfl: 0    mupirocin (BACTROBAN) 2 % ointment, Apply 1 application  topically to the appropriate area as directed 2 (Two) Times a Day for 5 days., Disp: 15 g, Rfl: 0    Allergies:  Allergies   Allergen Reactions    Penicillin G Itching     ITCHING    Penicillins Itching       Objective     Vital Signs:   Vitals:    09/13/23 1724   BP: 135/80   Pulse: 81   SpO2: 94%   Weight: 73.5 kg (162 lb)   Height: 160 cm (63\")     Body mass index is 28.7 kg/m².    Physical Exam:    Physical Exam  Exam conducted with a chaperone present.   HENT:      Head: Normocephalic and atraumatic.      Nose: Nose normal.   Eyes:      Pupils: Pupils are equal, round, and reactive to light.   Cardiovascular:      Rate and Rhythm: Normal rate.   Pulmonary:      Effort: Pulmonary effort is normal.   Genitourinary:         Comments: " Small painful lesion present with black center  Neurological:      General: No focal deficit present.      Mental Status: She is alert and oriented to person, place, and time.   Psychiatric:         Mood and Affect: Mood normal.         Behavior: Behavior normal.     Assessment / Plan     Assessment/Plan:   Problem List Items Addressed This Visit    None  Visit Diagnoses       Labial lesion    -  Primary    Relevant Medications    doxycycline (VIBRAMYCIN) 100 MG capsule    mupirocin (BACTROBAN) 2 % ointment    Other Relevant Orders    Ambulatory Referral to Obstetrics / Gynecology            Discussed plan of care in detail with pt today; pt verb understanding and agrees.    Follow up:  As needed      Electronically signed by ASA Billingsley   09/13/2023 17:41 EDT      Please note that portions of this note were completed with a voice recognition program.

## 2023-09-17 DIAGNOSIS — I10 ESSENTIAL HYPERTENSION: ICD-10-CM

## 2023-09-18 RX ORDER — LISINOPRIL 40 MG/1
TABLET ORAL
Qty: 90 TABLET | Refills: 0 | Status: SHIPPED | OUTPATIENT
Start: 2023-09-18

## 2023-09-18 RX ORDER — ATENOLOL 25 MG/1
TABLET ORAL
Qty: 90 TABLET | Refills: 0 | Status: SHIPPED | OUTPATIENT
Start: 2023-09-18

## 2023-10-20 DIAGNOSIS — I10 ESSENTIAL HYPERTENSION: ICD-10-CM

## 2023-10-23 RX ORDER — AMLODIPINE BESYLATE 10 MG/1
TABLET ORAL
Qty: 90 TABLET | Refills: 0 | Status: SHIPPED | OUTPATIENT
Start: 2023-10-23

## 2023-11-07 ENCOUNTER — OFFICE VISIT (OUTPATIENT)
Dept: FAMILY MEDICINE CLINIC | Facility: CLINIC | Age: 73
End: 2023-11-07
Payer: MEDICARE

## 2023-11-07 VITALS
WEIGHT: 161.3 LBS | HEART RATE: 71 BPM | OXYGEN SATURATION: 95 % | BODY MASS INDEX: 28.58 KG/M2 | HEIGHT: 63 IN | TEMPERATURE: 97.6 F | DIASTOLIC BLOOD PRESSURE: 90 MMHG | RESPIRATION RATE: 16 BRPM | SYSTOLIC BLOOD PRESSURE: 140 MMHG

## 2023-11-07 DIAGNOSIS — R10.9 ACUTE RIGHT FLANK PAIN: ICD-10-CM

## 2023-11-07 DIAGNOSIS — R30.0 DYSURIA: Primary | ICD-10-CM

## 2023-11-07 LAB
BILIRUB BLD-MCNC: NEGATIVE MG/DL
CLARITY, POC: CLEAR
COLOR UR: YELLOW
EXPIRATION DATE: NORMAL
GLUCOSE UR STRIP-MCNC: NEGATIVE MG/DL
KETONES UR QL: NEGATIVE
LEUKOCYTE EST, POC: NEGATIVE
Lab: NORMAL
NITRITE UR-MCNC: NEGATIVE MG/ML
PH UR: 5.5 [PH] (ref 5–8)
PROT UR STRIP-MCNC: NEGATIVE MG/DL
RBC # UR STRIP: NEGATIVE /UL
SP GR UR: 1.03 (ref 1–1.03)
UROBILINOGEN UR QL: NORMAL

## 2023-11-07 RX ORDER — CIPROFLOXACIN 500 MG/1
500 TABLET, FILM COATED ORAL 2 TIMES DAILY
Qty: 14 TABLET | Refills: 0 | Status: SHIPPED | OUTPATIENT
Start: 2023-11-07 | End: 2023-11-14

## 2023-11-07 NOTE — PROGRESS NOTES
"                      Established Patient        Chief Complaint:   Chief Complaint   Patient presents with    LUNG      PT IS HAVING RIGHT LUNG PAIN FOR 3 DAYS. PT STATES THAT SHE HAS NOT FELL OR BEEN SICK.          History of Present Illness:    Federica Lay is a 73 y.o. female who presents today with a complaint of right flank/chest pain.    Had bronchitis in November and PNA in July.    Onset 3 days ago. Denies any known injury. She has been sneezing frequently but denies any cough. Back, flank, and lower ribs on right side are very tender to touch. Pain is constant and \"just feels really sore\". Movement makes the pain worse, nothing makes it better. Has tried tylenol OTC, icy hot, and heating pad without relief.    Subjective     The following portions of the patient's history were reviewed and updated as appropriate: allergies, current medications, past family history, past medical history, past social history, past surgical history and problem list.    ALLERGIES  Allergies   Allergen Reactions    Penicillin G Itching     ITCHING    Penicillins Itching       ROS  Review of Systems   Constitutional:  Negative for chills and fever.   HENT:  Positive for sneezing. Negative for congestion.    Respiratory:  Positive for shortness of breath (with exertion). Negative for cough.    Cardiovascular:  Negative for chest pain and palpitations.   Gastrointestinal:  Negative for diarrhea, nausea and vomiting.   Genitourinary:  Positive for flank pain. Negative for dysuria, frequency and urgency.   Musculoskeletal:  Positive for myalgias (muscle spasm).       Objective     Vital Signs:   /90   Pulse 71   Temp 97.6 °F (36.4 °C)   Resp 16   Ht 160 cm (63\")   Wt 73.2 kg (161 lb 4.8 oz)   LMP  (LMP Unknown)   SpO2 95%   BMI 28.57 kg/m²     Physical Exam   Physical Exam  Eyes:      Pupils: Pupils are equal, round, and reactive to light.   Cardiovascular:      Rate and Rhythm: Normal rate and regular rhythm.      " Pulses: Normal pulses.      Heart sounds: Normal heart sounds.   Pulmonary:      Effort: Pulmonary effort is normal. No respiratory distress.      Breath sounds: Normal breath sounds. No wheezing, rhonchi or rales.   Chest:      Chest wall: Tenderness (right lower chest) present.   Abdominal:      General: Bowel sounds are normal.      Palpations: Abdomen is soft.      Tenderness: There is no abdominal tenderness. There is right CVA tenderness.   Neurological:      Mental Status: She is alert and oriented to person, place, and time.   Psychiatric:         Mood and Affect: Mood normal.         Behavior: Behavior normal.         Assessment and Plan      Assessment/Plan:   Diagnoses and all orders for this visit:    1. Dysuria (Primary)  -     POCT urinalysis dipstick, automated  -     ciprofloxacin (Cipro) 500 MG tablet; Take 1 tablet by mouth 2 (Two) Times a Day for 7 days.  Dispense: 14 tablet; Refill: 0  -     Urine Culture - Urine, Urine, Clean Catch; Future    2. Acute right flank pain  -     XR Chest PA & Lateral; Future  -     ciprofloxacin (Cipro) 500 MG tablet; Take 1 tablet by mouth 2 (Two) Times a Day for 7 days.  Dispense: 14 tablet; Refill: 0  -     Urine Culture - Urine, Urine, Clean Catch; Future    Risks, benefits, and potential side effects of current/new medications reviewed with patient.  Patient voiced understanding and wished to proceed with Cipro for empiric treatment of dysuria, flank pain.     Pending CXR and urine culture results.    I will contact patient regarding test results and provide instructions regarding any necessary changes in plan of care.    Patient was encouraged to keep me informed of any acute changes, lack of improvement, or any new concerning symptoms.    Patient voiced understanding of all instructions and denied further questions.      Discussion Summary:  Discussed plan of care in detail with pt today; pt verb understanding and agrees.      I spent 30 minutes caring for  Federica on this date of service. This time includes time spent by me in the following activities:preparing for the visit, reviewing tests, obtaining and/or reviewing a separately obtained history, performing a medically appropriate examination and/or evaluation , counseling and educating the patient/family/caregiver, ordering medications, tests, or procedures, and documenting information in the medical record    I, ASA Shields, personally performed the services described in this documentation, as scribed by Carmen Bass, [employer] student, in my presence, and is both accurate and complete.     I have reviewed and updated all copied forward information, as appropriate.  I attest to the accuracy and relevance of any unchanged information.      Follow up:  Return if symptoms worsen or fail to improve.     Patient Education:  There are no Patient Instructions on file for this visit.    ASA Shields  11/07/23  10:49 EST          Please note that portions of this note may have been completed with a voice recognition program.

## 2023-11-08 ENCOUNTER — TELEPHONE (OUTPATIENT)
Dept: FAMILY MEDICINE CLINIC | Facility: CLINIC | Age: 73
End: 2023-11-08

## 2023-11-08 NOTE — TELEPHONE ENCOUNTER
Caller: Federica Lay    Relationship: Self    Best call back number: [phone]     Caller requesting test results: THE PATIENT FEDERICA     What test was performed: X-RAY OF LUNGS    When was the test performed: 11/07/2023    Where was the test performed: [facility]    Additional notes: PLEASE CALL AND ADVISE

## 2023-11-10 ENCOUNTER — TELEPHONE (OUTPATIENT)
Dept: FAMILY MEDICINE CLINIC | Facility: CLINIC | Age: 73
End: 2023-11-10

## 2023-11-10 NOTE — TELEPHONE ENCOUNTER
Caller: Federica Lay    Relationship: Self    Best call back number:  [phone]     Who are you requesting to speak with (clinical staff, provider,  specific staff member): CLINICAL    What was the call regarding: WANTS LAB/URINE AND XRAY RESULTS. IS CONCERNED. PLEASE CALL ASAP. PATIENT IS STILL PAINING LOWER RIGHT AREA OF LUNG.

## 2023-11-14 NOTE — TELEPHONE ENCOUNTER
Patient is calling about results. She called last week and no one has gave her a call back with the results. I did not see a note in her chart.

## 2023-11-17 NOTE — TELEPHONE ENCOUNTER
Caller: Federica Lay     Relationship: Self     Best call back number: [phone]      Caller requesting test results: THE PATIENT FEDERICA      What test was performed: X-RAY OF LUNGS     When was the test performed: 11/07/2023     Where was the test performed: [facility]     Additional notes: SAYS SHE'S CALLED MANY TIMES AND NO ONE IS CALLING TO GIVE HER RESULTS  PLEASE CALL AND ADVISE

## 2023-11-17 NOTE — TELEPHONE ENCOUNTER
The ordering provider is. I did not see her for the issue therefore difficult to comment on the plan

## 2023-11-17 NOTE — TELEPHONE ENCOUNTER
Caller: Federica Lay     Relationship: Self     Best call back number:  [phone]      Who are you requesting to speak with (clinical staff, provider,  specific staff member): CLINICAL     What was the call regarding: HAS BEEN CALLING FOR SEVERAL DAYS NOW AND NO ONE HAS RETURNED CALL WANTS A CALL ASAP.     WANTS LAB/URINE AND XRAY RESULTS. IS CONCERNED. PLEASE CALL ASAP. PATIENT IS STILL PAINING LOWER RIGHT AREA OF LUNG.

## 2023-11-17 NOTE — TELEPHONE ENCOUNTER
Marlene ordered the test and saw the patient. Ordering provider is responsible for interpreting those test

## 2023-12-09 DIAGNOSIS — I10 ESSENTIAL HYPERTENSION: ICD-10-CM

## 2023-12-11 RX ORDER — LISINOPRIL 40 MG/1
TABLET ORAL
Qty: 90 TABLET | Refills: 0 | Status: SHIPPED | OUTPATIENT
Start: 2023-12-11

## 2023-12-11 RX ORDER — ATENOLOL 25 MG/1
TABLET ORAL
Qty: 90 TABLET | Refills: 0 | Status: SHIPPED | OUTPATIENT
Start: 2023-12-11

## 2024-01-11 DIAGNOSIS — I10 ESSENTIAL HYPERTENSION: ICD-10-CM

## 2024-01-11 RX ORDER — AMLODIPINE BESYLATE 10 MG/1
TABLET ORAL
Qty: 90 TABLET | Refills: 0 | Status: SHIPPED | OUTPATIENT
Start: 2024-01-11

## (undated) DEVICE — Device

## (undated) DEVICE — FRCP BIOP RADLJAW4 HOT 2.2X240 BX40

## (undated) DEVICE — JELLY,LUBE,STERILE,FLIP TOP,TUBE,2-OZ: Brand: MEDLINE

## (undated) DEVICE — SYR PREFIL W/SALINE FLSH 10ML

## (undated) DEVICE — FRCP BIOP COLD ENDOJAW ALLGTR W/NDL 2.8X2300MM BLU

## (undated) DEVICE — VLV SXN AIR/H2O ORCAPOD3 1P/U STRL

## (undated) DEVICE — HYBRID TUBING/CAP SET FOR OLYMPUS® SCOPES: Brand: ERBE

## (undated) DEVICE — SUCTION CANISTER, 1500CC, RIGID: Brand: DEROYAL

## (undated) DEVICE — ENDOSCOPY PORT CONNECTOR FOR OLYMPUS® SCOPES: Brand: ERBE

## (undated) DEVICE — SNAR POLYP SENSATION STDOVL 27 240 BX40

## (undated) DEVICE — TRAP,MUCUS SPECIMEN,40CC: Brand: MEDLINE

## (undated) DEVICE — ENDOGATOR AUXILIARY WATER JET CONNECTOR: Brand: ENDOGATOR

## (undated) DEVICE — PAD GRND REM POLYHESIVE A/ DISP

## (undated) DEVICE — LUBE JELLY PK/2.75GM STRL BX/144

## (undated) DEVICE — CONMED SCOPE SAVER BITE BLOCK, 20X27 MM: Brand: SCOPE SAVER